# Patient Record
Sex: FEMALE | Race: WHITE | NOT HISPANIC OR LATINO | Employment: UNEMPLOYED | URBAN - METROPOLITAN AREA
[De-identification: names, ages, dates, MRNs, and addresses within clinical notes are randomized per-mention and may not be internally consistent; named-entity substitution may affect disease eponyms.]

---

## 2023-03-12 DIAGNOSIS — K21.9 GASTRO-ESOPHAGEAL REFLUX DISEASE WITHOUT ESOPHAGITIS: ICD-10-CM

## 2023-03-12 DIAGNOSIS — I10 ESSENTIAL (PRIMARY) HYPERTENSION: ICD-10-CM

## 2023-03-13 ENCOUNTER — TELEPHONE (OUTPATIENT)
Dept: PRIMARY CARE | Facility: CLINIC | Age: 53
End: 2023-03-13
Payer: MEDICARE

## 2023-03-13 DIAGNOSIS — I10 ESSENTIAL (PRIMARY) HYPERTENSION: ICD-10-CM

## 2023-03-13 RX ORDER — OMEPRAZOLE 40 MG/1
CAPSULE, DELAYED RELEASE ORAL
Qty: 90 CAPSULE | Refills: 2 | Status: SHIPPED | OUTPATIENT
Start: 2023-03-13 | End: 2023-11-06

## 2023-03-13 RX ORDER — AMLODIPINE BESYLATE 5 MG/1
5 TABLET ORAL DAILY
Qty: 90 TABLET | Refills: 3 | Status: SHIPPED | OUTPATIENT
Start: 2023-03-13 | End: 2023-06-13 | Stop reason: DRUGHIGH

## 2023-03-13 RX ORDER — AMLODIPINE BESYLATE 5 MG/1
TABLET ORAL
Qty: 90 TABLET | Refills: 3 | Status: SHIPPED | OUTPATIENT
Start: 2023-03-13 | End: 2023-03-13 | Stop reason: SDUPTHER

## 2023-04-11 ENCOUNTER — TELEPHONE (OUTPATIENT)
Dept: PRIMARY CARE | Facility: CLINIC | Age: 53
End: 2023-04-11

## 2023-05-24 ENCOUNTER — TELEMEDICINE (OUTPATIENT)
Dept: PRIMARY CARE | Facility: CLINIC | Age: 53
End: 2023-05-24
Payer: MEDICARE

## 2023-05-24 DIAGNOSIS — R05.9 COUGH, UNSPECIFIED TYPE: Primary | ICD-10-CM

## 2023-05-24 PROCEDURE — 99441 PR PHYS/QHP TELEPHONE EVALUATION 5-10 MIN: CPT | Performed by: INTERNAL MEDICINE

## 2023-05-24 RX ORDER — AZITHROMYCIN 250 MG/1
TABLET, FILM COATED ORAL
Qty: 6 TABLET | Refills: 0 | Status: SHIPPED | OUTPATIENT
Start: 2023-05-24 | End: 2023-05-29

## 2023-05-24 RX ORDER — BENZONATATE 200 MG/1
200 CAPSULE ORAL 3 TIMES DAILY PRN
Qty: 42 CAPSULE | Refills: 0 | Status: SHIPPED | OUTPATIENT
Start: 2023-05-24 | End: 2023-06-23

## 2023-05-24 ASSESSMENT — ENCOUNTER SYMPTOMS
SORE THROAT: 1
FEVER: 1
COUGH: 1
SHORTNESS OF BREATH: 0
FATIGUE: 1

## 2023-05-24 NOTE — PROGRESS NOTES
Subjective    Sandra Gutiérrez is a 53 y.o. female who is evaluated Via telemedicine,  Patient is complaining of high fever, chills.  Has cough, hoarse voice.    HPI    This is a 53 years old English-speaking lady with medical history significant for mental retardation, moderate, hypertension, hyperlipidemia, gastroesophageal reflux disease, gastritis, colitis, status post colectomy, anemia, s/p hospitalization for colonic pseudoobstruction, bradycardia, complete heart block, s/p St Rainer dual chamber PPM implant 5/6/2022 to 5/13/2022.  Patient  is evaluated Via Telemedicine.  History is obtained with help of her mother in law.   Has fever, chills, developed cough.  Has been sick for 2 day.  Blood pressure is stable, well controlled.  Has fatigue and malaise.  Patient has unsteady gait, arthralgia.  Interested to start on physical therapy.        Review of Systems   Constitutional:  Positive for fatigue and fever.   HENT:  Positive for sore throat.    Respiratory:  Positive for cough. Negative for shortness of breath.        Objective      There were no vitals filed for this visit.     Physical Exam  Diagnoses and all orders for this visit:  Cough, unspecified type (Primary)  -     azithromycin (Zithromax) 250 mg tablet; Take 2 tablets (500 mg) by mouth once daily for 1 day, THEN 1 tablet (250 mg) once daily for 4 days. Take 2 tabs (500 mg) by mouth today, than 1 daily for 4 days..  -     benzonatate (Tessalon) 200 mg capsule; Take 1 capsule (200 mg) by mouth 3 times a day as needed for cough. Do not crush or chew.     - Ataxic, unsteady gait.  Follow by the neurology.  Physical therapy.     - Hypertension.  Well controlled.  Continue to take amlodipine to 10 mg daily.  Avoid salty foods.  Exercise as much as you can.     - Hyperlipidemia.  Continue to take Lipitor 20 mg once a day.  Keep Mediterranean diet.  Repeat lipid panel today.     - Depression, anxiety.  Current therapy with Zoloft.  Depression screening as  above.  Follow-up with psychiatry.     - Mental retardation.  Moderate.  Supportive measures.     - H of hospitalization for complete heart block, colonic pseudoobstruction, s/p St Rainer Dual pacemaker placement 5/6/2022 5/13/2022.  Patient is stable.  Medications as directed.  Follow-up with Dr. Vargas cardiology.     - Colonic pseudoobstruction.  Chronic constipations.  Taking docusate and MiraLAX with relief.     - Gastroesophageal reflux disease.  Continue to take Nexium.   Eat small portions.  Avoid Caffeine, spicy foods, chocolate, alcohol.  Do not wear tight clothes.  Keep last meal before bed time > 3 hours.  Keep head side of the bed elevated at all time.     - Overactive Bladder.  Follow up with urology.  Urinalysis.     - Anemia.  Follow up with GI.  Blood work today.     - Health maintenance.  Return for Medicare wellness annual exam.  Return for vaccinations.  GYN exam.  Mammography.     - Normal body weight with BMI 20.51  Keep ideal BMI is less than 25.  Diet, exercise.     Less, than  10 min        follow-up in 1 months.   Sandra Zaidi MD

## 2023-05-25 ENCOUNTER — APPOINTMENT (OUTPATIENT)
Dept: PRIMARY CARE | Facility: CLINIC | Age: 53
End: 2023-05-25
Payer: MEDICARE

## 2023-06-04 DIAGNOSIS — Z00.00 ENCOUNTER FOR GENERAL ADULT MEDICAL EXAMINATION WITHOUT ABNORMAL FINDINGS: ICD-10-CM

## 2023-06-06 RX ORDER — ACETAMINOPHEN 500 MG
TABLET ORAL
Qty: 90 CAPSULE | Refills: 2 | Status: SHIPPED | OUTPATIENT
Start: 2023-06-06 | End: 2023-09-04

## 2023-06-13 ENCOUNTER — OFFICE VISIT (OUTPATIENT)
Dept: PRIMARY CARE | Facility: CLINIC | Age: 53
End: 2023-06-13
Payer: MEDICARE

## 2023-06-13 VITALS
WEIGHT: 103 LBS | TEMPERATURE: 94.3 F | HEIGHT: 59 IN | BODY MASS INDEX: 20.76 KG/M2 | HEART RATE: 64 BPM | RESPIRATION RATE: 16 BRPM | DIASTOLIC BLOOD PRESSURE: 72 MMHG | SYSTOLIC BLOOD PRESSURE: 132 MMHG

## 2023-06-13 DIAGNOSIS — I44.2 COMPLETE HEART BLOCK (MULTI): ICD-10-CM

## 2023-06-13 DIAGNOSIS — R53.81 MALAISE AND FATIGUE: ICD-10-CM

## 2023-06-13 DIAGNOSIS — E87.6 HYPOKALEMIA: ICD-10-CM

## 2023-06-13 DIAGNOSIS — R53.83 MALAISE AND FATIGUE: ICD-10-CM

## 2023-06-13 DIAGNOSIS — Z12.31 ENCOUNTER FOR SCREENING MAMMOGRAM FOR MALIGNANT NEOPLASM OF BREAST: Primary | ICD-10-CM

## 2023-06-13 DIAGNOSIS — E55.9 VITAMIN D DEFICIENCY: ICD-10-CM

## 2023-06-13 DIAGNOSIS — M25.512 LEFT SHOULDER PAIN, UNSPECIFIED CHRONICITY: ICD-10-CM

## 2023-06-13 DIAGNOSIS — E53.8 VITAMIN B12 DEFICIENCY: ICD-10-CM

## 2023-06-13 DIAGNOSIS — E78.5 HYPERLIPIDEMIA, UNSPECIFIED HYPERLIPIDEMIA TYPE: ICD-10-CM

## 2023-06-13 DIAGNOSIS — R73.9 HYPERGLYCEMIA, UNSPECIFIED: ICD-10-CM

## 2023-06-13 DIAGNOSIS — F33.9 DEPRESSION, RECURRENT (CMS-HCC): ICD-10-CM

## 2023-06-13 DIAGNOSIS — Q02 MICROCEPHALY (MULTI): ICD-10-CM

## 2023-06-13 DIAGNOSIS — E03.9 HYPOTHYROIDISM, UNSPECIFIED TYPE: ICD-10-CM

## 2023-06-13 PROCEDURE — 80053 COMPREHEN METABOLIC PANEL: CPT | Performed by: INTERNAL MEDICINE

## 2023-06-13 PROCEDURE — 82306 VITAMIN D 25 HYDROXY: CPT | Performed by: INTERNAL MEDICINE

## 2023-06-13 PROCEDURE — 80061 LIPID PANEL: CPT | Performed by: INTERNAL MEDICINE

## 2023-06-13 PROCEDURE — 1036F TOBACCO NON-USER: CPT | Performed by: INTERNAL MEDICINE

## 2023-06-13 PROCEDURE — 82607 VITAMIN B-12: CPT | Performed by: INTERNAL MEDICINE

## 2023-06-13 PROCEDURE — 85025 COMPLETE CBC W/AUTO DIFF WBC: CPT | Performed by: INTERNAL MEDICINE

## 2023-06-13 PROCEDURE — 99213 OFFICE O/P EST LOW 20 MIN: CPT | Performed by: INTERNAL MEDICINE

## 2023-06-13 PROCEDURE — 84443 ASSAY THYROID STIM HORMONE: CPT | Performed by: INTERNAL MEDICINE

## 2023-06-13 PROCEDURE — 83036 HEMOGLOBIN GLYCOSYLATED A1C: CPT | Performed by: INTERNAL MEDICINE

## 2023-06-13 RX ORDER — SENNOSIDES 8.6 MG/1
TABLET ORAL 2 TIMES DAILY
COMMUNITY
Start: 2022-05-13 | End: 2024-04-10 | Stop reason: ALTCHOICE

## 2023-06-13 RX ORDER — POLYETHYLENE GLYCOL 3350 17 G/17G
POWDER, FOR SOLUTION ORAL
COMMUNITY
Start: 2018-04-16

## 2023-06-13 RX ORDER — SODIUM PICOSULFATE, MAGNESIUM OXIDE, AND ANHYDROUS CITRIC ACID 10; 3.5; 12 MG/160ML; G/160ML; G/160ML
LIQUID ORAL
COMMUNITY
Start: 2021-10-26 | End: 2024-04-10 | Stop reason: ALTCHOICE

## 2023-06-13 RX ORDER — CICLOPIROX 1 G/100ML
SHAMPOO TOPICAL
COMMUNITY
Start: 2016-04-26

## 2023-06-13 RX ORDER — CYANOCOBALAMIN/FOLIC AC/VIT B6 1-2.2-25MG
1 TABLET ORAL DAILY
COMMUNITY
Start: 2023-05-22

## 2023-06-13 RX ORDER — POTASSIUM CHLORIDE 20 MEQ/1
2 TABLET, EXTENDED RELEASE ORAL DAILY
COMMUNITY
Start: 2018-10-14 | End: 2023-06-13 | Stop reason: SDUPTHER

## 2023-06-13 RX ORDER — AMLODIPINE BESYLATE 10 MG/1
1 TABLET ORAL DAILY
COMMUNITY
Start: 2014-12-22 | End: 2024-04-25 | Stop reason: SDUPTHER

## 2023-06-13 RX ORDER — ATENOLOL 25 MG/1
TABLET ORAL
COMMUNITY
Start: 2007-05-09

## 2023-06-13 RX ORDER — DOCUSATE SODIUM 100 MG/1
CAPSULE, LIQUID FILLED ORAL 2 TIMES DAILY
COMMUNITY
Start: 2022-05-13 | End: 2024-04-10 | Stop reason: ALTCHOICE

## 2023-06-13 RX ORDER — ESOMEPRAZOLE MAGNESIUM 40 MG/1
CAPSULE, DELAYED RELEASE ORAL
COMMUNITY
Start: 2007-05-09

## 2023-06-13 RX ORDER — POTASSIUM CHLORIDE 20 MEQ/1
40 TABLET, EXTENDED RELEASE ORAL DAILY
Qty: 60 TABLET | Refills: 3 | Status: SHIPPED | OUTPATIENT
Start: 2023-06-13 | End: 2023-07-06

## 2023-06-13 RX ORDER — SERTRALINE HYDROCHLORIDE 25 MG/1
25 TABLET, FILM COATED ORAL DAILY
COMMUNITY

## 2023-06-13 ASSESSMENT — ENCOUNTER SYMPTOMS
LOSS OF SENSATION IN FEET: 1
FREQUENCY: 1
NERVOUS/ANXIOUS: 1
APPETITE CHANGE: 1
SLEEP DISTURBANCE: 1
ABDOMINAL DISTENTION: 0
ARTHRALGIAS: 1
OCCASIONAL FEELINGS OF UNSTEADINESS: 1
CONSTIPATION: 1
WEAKNESS: 1
ACTIVITY CHANGE: 1
UNEXPECTED WEIGHT CHANGE: 0
BACK PAIN: 1

## 2023-06-13 NOTE — PROGRESS NOTES
Subjective    Sandra Gutiérrez is a 53 y.o. female who presents for  follow up.  Weak.  Patient is here for Blood work.    HPI    This is a 53 years old Sudanese-speaking lady with medical history significant for mental retardation, moderate, hypertension, hyperlipidemia, gastroesophageal reflux disease, gastritis, colitis, status post colectomy, anemia, s/p hospitalization for colonic pseudoobstruction, bradycardia, complete heart block, s/p St Rainer dual chamber PPM implant 5/6/2022 to 5/13/2022.  Patient is presented for evaluation and treatment of the above complaints.  Blood pressure is stable, well controlled.  Has fatigue and malaise.  Patient has unsteady gait, arthralgia.  Interested to start on physical therapy.     Has been biting nails, cannot control it.  Weak.     Still has occasional headaches.  Having small joint pain.  Taking medications as directed.  Has insomnia .     Review of Systems   Constitutional:  Positive for activity change and appetite change. Negative for unexpected weight change.   Gastrointestinal:  Positive for constipation. Negative for abdominal distention.   Genitourinary:  Positive for frequency.   Musculoskeletal:  Positive for arthralgias, back pain and gait problem.   Neurological:  Positive for weakness.   Psychiatric/Behavioral:  Positive for sleep disturbance. The patient is nervous/anxious.        Objective        Vitals:    06/13/23 1134   BP: 132/72   Pulse: 64   Resp: 16   Temp: 34.6 °C (94.3 °F)        Physical Exam  Constitutional:       Appearance: Normal appearance.   HENT:      Head: Normocephalic and atraumatic.      Nose: Nose normal.      Mouth/Throat:      Mouth: Mucous membranes are moist.   Cardiovascular:      Rate and Rhythm: Normal rate.   Pulmonary:      Effort: Pulmonary effort is normal.   Abdominal:      Palpations: Abdomen is soft.   Musculoskeletal:         General: Normal range of motion.      Cervical back: Normal range of motion.   Skin:     General:  Skin is warm and dry.   Neurological:      General: No focal deficit present.      Mental Status: She is alert. Mental status is at baseline.   Psychiatric:         Mood and Affect: Mood normal.       Diagnoses and all orders for this visit:  Encounter for screening mammogram for malignant neoplasm of breast (Primary)  -     BI mammo bilateral screening tomosynthesis; Future  Left shoulder pain, unspecified chronicity  -     XR shoulder left 2+ views; Future  -     XR shoulder left 2+ views  Microcephaly (CMS/HCC)  Depression, recurrent (CMS/HCC)  Complete heart block (CMS/HCC)  Hyperlipidemia, unspecified hyperlipidemia type  -     Comprehensive Metabolic Panel  -     Lipid Panel  Hyperglycemia, unspecified  -     Hemoglobin A1C  Vitamin B12 deficiency  -     Vitamin B12  Vitamin D deficiency  -     Vitamin D, Total  Hypothyroidism, unspecified type  -     Thyroid Stimulating Hormone  Malaise and fatigue  -     CBC  Hypokalemia  -     potassium chloride CR (Klor-Con M20) 20 mEq ER tablet; Take 2 tablets (40 mEq) by mouth once daily.       - Ataxic, unsteady gait.  Follow by the neurology.  Start on physical therapy.     - Hypertension.  Well controlled.  Continue to take amlodipine to 10 mg daily.  Avoid salty foods.  Exercise as much as you can.     - Hyperlipidemia.  Continue to take Lipitor 20 mg once a day.  Keep Mediterranean diet.  Repeat lipid panel today.     - Depression, anxiety.  Current therapy with Zoloft.  Depression screening as above.  Follow-up with psychiatry.     - Mental retardation.  Moderate.  Supportive measures.     - H of hospitalization for complete heart block, colonic pseudoobstruction, s/p St Rainer Dual pacemaker placement 5/6/2022 5/13/2022.  Patient is stable.  Medications as directed.  Follow-up with Dr. Vargas cardiology.     - Colonic pseudoobstruction.  Chronic constipations.  Taking docusate and MiraLAX with relief.     - Gastroesophageal reflux disease.  Continue to take  Nexium.   Eat small portions.  Avoid Caffeine, spicy foods, chocolate, alcohol.  Do not wear tight clothes.  Keep last meal before bed time > 3 hours.  Keep head side of the bed elevated at all time.     - Overactive Bladder.  Follow up with urology.  Urinalysis.     - Anemia.  Follow up with GI.  Blood work today.     - Health maintenance.  Return for Medicare wellness annual exam.  Return for vaccinations.  GYN exam.  Mammography.     - Normal body weight with BMI 20.80  Keep ideal BMI is less than 25.  Diet, exercise.        Sandra Zaidi MD Patient was identified as a fall risk. Risk prevention instructions provided.

## 2023-06-13 NOTE — PATIENT INSTRUCTIONS

## 2023-06-14 DIAGNOSIS — R26.81 UNSTEADY GAIT: Primary | ICD-10-CM

## 2023-06-14 DIAGNOSIS — H61.23 EXCESSIVE EAR WAX, BILATERAL: ICD-10-CM

## 2023-06-14 NOTE — TELEPHONE ENCOUNTER
Discussed with patient mother-in-law about blood work results.  Hemoglobin A1c 6.3.  White blood cell count is 4.43.  Has decreased hemoglobin 10.3.  Patient has low sodium 135.  Continue with same medications for cholesterol, total 168, LDL is 73.     Patient

## 2023-06-15 DIAGNOSIS — M25.512 LEFT SHOULDER PAIN, UNSPECIFIED CHRONICITY: Primary | ICD-10-CM

## 2023-06-23 PROCEDURE — G0179 MD RECERTIFICATION HHA PT: HCPCS | Performed by: INTERNAL MEDICINE

## 2023-07-06 DIAGNOSIS — E87.6 HYPOKALEMIA: ICD-10-CM

## 2023-07-06 RX ORDER — POTASSIUM CHLORIDE 1500 MG/1
TABLET, EXTENDED RELEASE ORAL
Qty: 60 TABLET | Refills: 3 | Status: SHIPPED | OUTPATIENT
Start: 2023-07-06 | End: 2023-07-24

## 2023-07-23 DIAGNOSIS — E87.6 HYPOKALEMIA: ICD-10-CM

## 2023-07-24 RX ORDER — POTASSIUM CHLORIDE 1500 MG/1
40 TABLET, EXTENDED RELEASE ORAL DAILY
Qty: 180 TABLET | Refills: 1 | Status: SHIPPED | OUTPATIENT
Start: 2023-07-24 | End: 2024-03-11

## 2023-08-22 PROCEDURE — G0179 MD RECERTIFICATION HHA PT: HCPCS | Performed by: INTERNAL MEDICINE

## 2023-10-09 ENCOUNTER — HOSPITAL ENCOUNTER (OUTPATIENT)
Dept: CARDIOLOGY | Facility: CLINIC | Age: 53
Discharge: HOME | End: 2023-10-09
Payer: MEDICARE

## 2023-10-09 DIAGNOSIS — I44.2 ATRIOVENTRICULAR BLOCK, COMPLETE (MULTI): ICD-10-CM

## 2023-10-09 DIAGNOSIS — Z95.0 PRESENCE OF CARDIAC PACEMAKER: ICD-10-CM

## 2023-10-09 PROCEDURE — 93296 REM INTERROG EVL PM/IDS: CPT

## 2023-10-09 PROCEDURE — 93294 REM INTERROG EVL PM/LDLS PM: CPT | Performed by: INTERNAL MEDICINE

## 2023-12-20 PROCEDURE — G0179 MD RECERTIFICATION HHA PT: HCPCS | Performed by: INTERNAL MEDICINE

## 2024-01-15 ENCOUNTER — HOSPITAL ENCOUNTER (OUTPATIENT)
Dept: CARDIOLOGY | Facility: CLINIC | Age: 54
Discharge: HOME | End: 2024-01-15
Payer: MEDICARE

## 2024-01-15 DIAGNOSIS — I44.2 ATRIOVENTRICULAR BLOCK, COMPLETE (MULTI): ICD-10-CM

## 2024-01-15 DIAGNOSIS — Z95.0 PRESENCE OF CARDIAC PACEMAKER: ICD-10-CM

## 2024-01-15 PROCEDURE — 93294 REM INTERROG EVL PM/LDLS PM: CPT | Performed by: INTERNAL MEDICINE

## 2024-01-15 PROCEDURE — 93296 REM INTERROG EVL PM/IDS: CPT

## 2024-01-30 ENCOUNTER — OFFICE VISIT (OUTPATIENT)
Dept: PRIMARY CARE | Facility: CLINIC | Age: 54
End: 2024-01-30
Payer: MEDICARE

## 2024-01-30 VITALS
HEART RATE: 64 BPM | BODY MASS INDEX: 20.51 KG/M2 | SYSTOLIC BLOOD PRESSURE: 122 MMHG | TEMPERATURE: 97.7 F | WEIGHT: 105 LBS | RESPIRATION RATE: 16 BRPM | DIASTOLIC BLOOD PRESSURE: 78 MMHG

## 2024-01-30 DIAGNOSIS — Z78.0 ASYMPTOMATIC MENOPAUSAL STATE: ICD-10-CM

## 2024-01-30 DIAGNOSIS — R39.15 URGENCY OF URINATION: ICD-10-CM

## 2024-01-30 DIAGNOSIS — Z12.11 ENCOUNTER FOR SCREENING FOR MALIGNANT NEOPLASM OF COLON: ICD-10-CM

## 2024-01-30 DIAGNOSIS — I73.9 PERIPHERAL VASCULAR DISEASE, UNSPECIFIED (CMS-HCC): ICD-10-CM

## 2024-01-30 DIAGNOSIS — E55.9 VITAMIN D DEFICIENCY: ICD-10-CM

## 2024-01-30 DIAGNOSIS — R32 URINARY INCONTINENCE, UNSPECIFIED TYPE: ICD-10-CM

## 2024-01-30 DIAGNOSIS — F33.1 MAJOR DEPRESSIVE DISORDER, RECURRENT, MODERATE (MULTI): ICD-10-CM

## 2024-01-30 DIAGNOSIS — R53.83 MALAISE AND FATIGUE: ICD-10-CM

## 2024-01-30 DIAGNOSIS — R73.9 HYPERGLYCEMIA, UNSPECIFIED: ICD-10-CM

## 2024-01-30 DIAGNOSIS — R53.81 MALAISE AND FATIGUE: ICD-10-CM

## 2024-01-30 DIAGNOSIS — I44.2 COMPLETE HEART BLOCK (MULTI): ICD-10-CM

## 2024-01-30 DIAGNOSIS — E53.8 VITAMIN B12 DEFICIENCY: ICD-10-CM

## 2024-01-30 DIAGNOSIS — Q02 MICROCEPHALY (MULTI): ICD-10-CM

## 2024-01-30 DIAGNOSIS — E78.5 HYPERLIPIDEMIA, UNSPECIFIED HYPERLIPIDEMIA TYPE: ICD-10-CM

## 2024-01-30 DIAGNOSIS — Z00.00 ROUTINE GENERAL MEDICAL EXAMINATION AT HEALTH CARE FACILITY: ICD-10-CM

## 2024-01-30 DIAGNOSIS — Z12.31 ENCOUNTER FOR SCREENING MAMMOGRAM FOR MALIGNANT NEOPLASM OF BREAST: ICD-10-CM

## 2024-01-30 DIAGNOSIS — R26.81 UNSTEADY GAIT: Primary | ICD-10-CM

## 2024-01-30 DIAGNOSIS — F33.9 DEPRESSION, RECURRENT (CMS-HCC): ICD-10-CM

## 2024-01-30 DIAGNOSIS — E78.2 MIXED HYPERLIPIDEMIA: ICD-10-CM

## 2024-01-30 LAB
APPEARANCE UR: CLEAR
BILIRUB UR QL STRIP: NEGATIVE
COLOR UR: YELLOW
GLUCOSE UR STRIP-MCNC: NEGATIVE MG/DL
HGB UR QL STRIP: NEGATIVE
KETONES UR STRIP-MCNC: NEGATIVE MG/DL
LEUKOCYTE ESTERASE UR QL STRIP: NEGATIVE
NITRITE UR QL STRIP: NEGATIVE
PH UR STRIP: 7 [PH]
PROT UR STRIP-MCNC: NEGATIVE MG/DL
SP GR UR STRIP.AUTO: 1.02
UROBILINOGEN UR STRIP-ACNC: 0.2 E.U./DL

## 2024-01-30 PROCEDURE — G0439 PPPS, SUBSEQ VISIT: HCPCS | Performed by: INTERNAL MEDICINE

## 2024-01-30 PROCEDURE — 84460 ALANINE AMINO (ALT) (SGPT): CPT | Performed by: INTERNAL MEDICINE

## 2024-01-30 PROCEDURE — 99213 OFFICE O/P EST LOW 20 MIN: CPT | Performed by: INTERNAL MEDICINE

## 2024-01-30 PROCEDURE — 84450 TRANSFERASE (AST) (SGOT): CPT | Performed by: INTERNAL MEDICINE

## 2024-01-30 PROCEDURE — 1036F TOBACCO NON-USER: CPT | Performed by: INTERNAL MEDICINE

## 2024-01-30 PROCEDURE — 82607 VITAMIN B-12: CPT | Performed by: INTERNAL MEDICINE

## 2024-01-30 PROCEDURE — 83036 HEMOGLOBIN GLYCOSYLATED A1C: CPT | Performed by: INTERNAL MEDICINE

## 2024-01-30 PROCEDURE — 80061 LIPID PANEL: CPT | Performed by: INTERNAL MEDICINE

## 2024-01-30 PROCEDURE — 84443 ASSAY THYROID STIM HORMONE: CPT | Performed by: INTERNAL MEDICINE

## 2024-01-30 PROCEDURE — 80048 BASIC METABOLIC PNL TOTAL CA: CPT | Performed by: INTERNAL MEDICINE

## 2024-01-30 PROCEDURE — 85025 COMPLETE CBC W/AUTO DIFF WBC: CPT | Performed by: INTERNAL MEDICINE

## 2024-01-30 PROCEDURE — 81003 URINALYSIS AUTO W/O SCOPE: CPT | Performed by: INTERNAL MEDICINE

## 2024-01-30 PROCEDURE — 82306 VITAMIN D 25 HYDROXY: CPT | Performed by: INTERNAL MEDICINE

## 2024-01-30 RX ORDER — ACETAMINOPHEN 500 MG
TABLET ORAL DAILY
COMMUNITY
Start: 2024-01-07 | End: 2024-04-08

## 2024-01-30 RX ORDER — ATORVASTATIN CALCIUM 40 MG/1
40 TABLET, FILM COATED ORAL DAILY
Qty: 90 TABLET | Refills: 3 | Status: SHIPPED | OUTPATIENT
Start: 2024-01-30 | End: 2024-04-29

## 2024-01-30 ASSESSMENT — ANXIETY QUESTIONNAIRES
3. WORRYING TOO MUCH ABOUT DIFFERENT THINGS: SEVERAL DAYS
7. FEELING AFRAID AS IF SOMETHING AWFUL MIGHT HAPPEN: SEVERAL DAYS
1. FEELING NERVOUS, ANXIOUS, OR ON EDGE: SEVERAL DAYS
GAD7 TOTAL SCORE: 7
6. BECOMING EASILY ANNOYED OR IRRITABLE: SEVERAL DAYS
5. BEING SO RESTLESS THAT IT IS HARD TO SIT STILL: SEVERAL DAYS
4. TROUBLE RELAXING: SEVERAL DAYS
2. NOT BEING ABLE TO STOP OR CONTROL WORRYING: SEVERAL DAYS
IF YOU CHECKED OFF ANY PROBLEMS ON THIS QUESTIONNAIRE, HOW DIFFICULT HAVE THESE PROBLEMS MADE IT FOR YOU TO DO YOUR WORK, TAKE CARE OF THINGS AT HOME, OR GET ALONG WITH OTHER PEOPLE: SOMEWHAT DIFFICULT

## 2024-01-30 ASSESSMENT — PATIENT HEALTH QUESTIONNAIRE - PHQ9
10. IF YOU CHECKED OFF ANY PROBLEMS, HOW DIFFICULT HAVE THESE PROBLEMS MADE IT FOR YOU TO DO YOUR WORK, TAKE CARE OF THINGS AT HOME, OR GET ALONG WITH OTHER PEOPLE: SOMEWHAT DIFFICULT
1. LITTLE INTEREST OR PLEASURE IN DOING THINGS: SEVERAL DAYS
2. FEELING DOWN, DEPRESSED OR HOPELESS: SEVERAL DAYS
SUM OF ALL RESPONSES TO PHQ9 QUESTIONS 1 AND 2: 2

## 2024-01-30 ASSESSMENT — ENCOUNTER SYMPTOMS
APPETITE CHANGE: 1
BACK PAIN: 1
SHORTNESS OF BREATH: 0
NERVOUS/ANXIOUS: 1
OCCASIONAL FEELINGS OF UNSTEADINESS: 1
APNEA: 0
FATIGUE: 1
DEPRESSION: 1
LOSS OF SENSATION IN FEET: 1
HEADACHES: 0
ARTHRALGIAS: 1
LIGHT-HEADEDNESS: 0
FEVER: 0
PALPITATIONS: 0
SLEEP DISTURBANCE: 1
ACTIVITY CHANGE: 1

## 2024-01-30 ASSESSMENT — PAIN SCALES - GENERAL: PAINLEVEL: 0-NO PAIN

## 2024-01-30 ASSESSMENT — COLUMBIA-SUICIDE SEVERITY RATING SCALE - C-SSRS
1. IN THE PAST MONTH, HAVE YOU WISHED YOU WERE DEAD OR WISHED YOU COULD GO TO SLEEP AND NOT WAKE UP?: NO
6. HAVE YOU EVER DONE ANYTHING, STARTED TO DO ANYTHING, OR PREPARED TO DO ANYTHING TO END YOUR LIFE?: NO
2. HAVE YOU ACTUALLY HAD ANY THOUGHTS OF KILLING YOURSELF?: NO

## 2024-01-30 NOTE — PROGRESS NOTES
Subjective    Sandra Gutiérrez is a 53 y.o. female who presents for  follow up.  Has unsteady gait.  Weak.  Patient is here for Blood work.    HPI    This is a 53 years old Nepalese-speaking lady with medical history significant for mental retardation, moderate, hypertension, hyperlipidemia, gastroesophageal reflux disease, gastritis, colitis, status post colectomy, anemia, s/p hospitalization for colonic pseudoobstruction, bradycardia, complete heart block, s/p St Rainer dual chamber PPM implant 5/6/2022 to 5/13/2022.  Patient is presented for evaluation and treatment of the above complaints.  Blood pressure is stable, well controlled.  Has fatigue and malaise.  Patient has unsteady gait, arthralgia.  Interested to start on physical therapy.  Has fall episodes.     Has been biting nails, cannot control it.  Weak.     Still has occasional headaches.  Having small joint pain.  Taking medications as directed.  Has insomnia .     Review of Systems   Constitutional:  Positive for activity change, appetite change and fatigue. Negative for fever.   Respiratory:  Negative for apnea and shortness of breath.    Cardiovascular:  Negative for chest pain, palpitations and leg swelling.   Musculoskeletal:  Positive for arthralgias, back pain and gait problem.   Neurological:  Negative for light-headedness and headaches.   Psychiatric/Behavioral:  Positive for sleep disturbance. The patient is nervous/anxious.        Objective        Vitals:    01/30/24 1058   BP: 122/78   Pulse: 64   Resp: 16   Temp: 36.5 °C (97.7 °F)        Physical Exam  HENT:      Head: Normocephalic.      Right Ear: Tympanic membrane normal.      Left Ear: Tympanic membrane normal.   Cardiovascular:      Rate and Rhythm: Normal rate.      Pulses: Normal pulses.   Pulmonary:      Effort: Pulmonary effort is normal.      Breath sounds: Normal breath sounds.   Abdominal:      Palpations: Abdomen is soft.   Skin:     General: Skin is warm.   Neurological:       General: No focal deficit present.      Mental Status: She is alert.   Psychiatric:         Mood and Affect: Mood normal.       Diagnoses and all orders for this visit:  Unsteady gait (Primary)  -     Referral to Physical Therapy; Future  Urgency of urination  -     POCT UA (Automated) docked device  Hyperlipidemia, unspecified hyperlipidemia type  -     Alanine Aminotransferase  -     Aspartate Aminotransferase  -     Basic Metabolic Panel  -     Lipid Panel  Vitamin B12 deficiency  -     Vitamin B12  Vitamin D deficiency  -     Vitamin D 25-Hydroxy,Total (for eval of Vitamin D levels)  Malaise and fatigue  -     CBC  -     Thyroid Stimulating Hormone  Hyperglycemia, unspecified  -     Hemoglobin A1C  Encounter for screening mammogram for malignant neoplasm of breast  -     BI mammo bilateral screening tomosynthesis; Future  Encounter for screening for malignant neoplasm of colon  -     Colonoscopy Screening; High Risk Patient; Future  Urinary incontinence, unspecified type  -     Referral to Urogynecology; Future  Major depressive disorder, recurrent, moderate (CMS/HCC)  Peripheral vascular disease, unspecified (CMS/HCC)  Microcephaly (CMS/HCC)  Depression, recurrent (CMS/HCC)  Complete heart block (CMS/HCC)  Asymptomatic menopausal state  -     XR DEXA bone density; Future  Mixed hyperlipidemia  -     atorvastatin (Lipitor) 40 mg tablet; Take 1 tablet (40 mg) by mouth once daily.  Routine general medical examination at health care facility  -     1 Year Follow Up In Primary Care - Wellness Exam; Future  Other orders  -     POCT URINALYSIS AUTOMATED       Ataxic, unsteady gait.  Follow by the neurology.  Start on physical therapy.     - Hypertension.  Well controlled.  Continue to take amlodipine to 10 mg daily.  Avoid salty foods.  Exercise as much as you can.     - Hyperlipidemia.  Continue to take Lipitor 20 mg once a day.  Keep Mediterranean diet.  Repeat lipid panel today.  Casandra 170, LDL 62.     -  Depression, anxiety.  Current therapy with Zoloft.  Depression screening as above.  Follow-up with psychiatry.     - Mental retardation.  Moderate.  Supportive measures.     - H of hospitalization for complete heart block, colonic pseudoobstruction, s/p St Rainer Dual pacemaker placement 5/6/2022 5/13/2022.  Patient is stable.  Medications as directed.  Follow-up with Dr. Vargas cardiology.     - Colonic pseudoobstruction.  Chronic constipations.  Taking docusate and MiraLAX with relief.     - Gastroesophageal reflux disease.  Continue to take Nexium.   Eat small portions.  Avoid Caffeine, spicy foods, chocolate, alcohol.  Do not wear tight clothes.  Keep last meal before bed time > 3 hours.  Keep head side of the bed elevated at all time.     - Overactive Bladder.  Follow up with urology.  Urinalysis.     - Anemia.  Follow up with GI.   Hb 10. 45, H 32.2.    - Health maintenance.  Medicare wellness annual exam.  Return for vaccinations.  GYN exam.  Mammography.     - Normal body weight with BMI 20.51  Keep ideal BMI is less than 25.  Diet, exercise.      Sandra Zaidi MD   Patient was identified as a fall risk.   Risk prevention instructions provided.

## 2024-02-08 DIAGNOSIS — R10.9 ABDOMINAL PAIN, UNSPECIFIED ABDOMINAL LOCATION: Primary | ICD-10-CM

## 2024-02-15 ENCOUNTER — EVALUATION (OUTPATIENT)
Dept: PHYSICAL THERAPY | Facility: CLINIC | Age: 54
End: 2024-02-15
Payer: MEDICARE

## 2024-02-15 VITALS — DIASTOLIC BLOOD PRESSURE: 87 MMHG | HEART RATE: 83 BPM | SYSTOLIC BLOOD PRESSURE: 143 MMHG

## 2024-02-15 DIAGNOSIS — Z91.81 AT RISK FOR FALLS: ICD-10-CM

## 2024-02-15 DIAGNOSIS — R26.81 UNSTEADY GAIT: ICD-10-CM

## 2024-02-15 DIAGNOSIS — R26.81 UNSTEADINESS ON FEET: Primary | ICD-10-CM

## 2024-02-15 PROCEDURE — 97163 PT EVAL HIGH COMPLEX 45 MIN: CPT | Mod: GP | Performed by: PHYSICAL THERAPIST

## 2024-02-15 PROCEDURE — 97116 GAIT TRAINING THERAPY: CPT | Mod: GP | Performed by: PHYSICAL THERAPIST

## 2024-02-15 ASSESSMENT — PAIN - FUNCTIONAL ASSESSMENT: PAIN_FUNCTIONAL_ASSESSMENT: 0-10

## 2024-02-15 ASSESSMENT — ENCOUNTER SYMPTOMS
OCCASIONAL FEELINGS OF UNSTEADINESS: 1
LOSS OF SENSATION IN FEET: 0
DEPRESSION: 1

## 2024-02-15 ASSESSMENT — BALANCE ASSESSMENTS
SITTING WITH BACK UNSUPPORTED BUT FEET SUPPORTED ON FLOOR OR ON A STOOL: ABLE TO SIT SAFELY AND SECURELY FOR 2 MINUTES
PLACE ALTERNATE FOOT ON STEP OR STOOL WHILE STANDING UNSUPPORTED: ABLE TO COMPLETE GREATER THAN 2 STEPS NEEDS MINIMAL ASSIST
STANDING TO SITTING: CONTROLS DESCENT BY USING HANDS
STANDING UNSUPPORTED ONE FOOT IN FRONT: NEEDS HELP TO STEP BUT CAN HOLD 15 SECONDS
TURN 360 DEGREES: NEEDS CLOSE SUPERVISION OR VERBAL CUEING
REACHING FORWARD WITH OUTSTRETCHED ARM WHILE STANDING: REACHES FORWARD BUT NEEDS SUPERVISION
STANDING UNSUPPORTED: ABLE TO STAND SAFELY FOR 2 MINUTES
PICK UP OBJECT FROM THE FLOOR FROM A STANDING POSITION: UNABLE TO PICK UP BUT REACHES 2-5 CM (1-2 INCHES) FROM SLIPPER AND KEEPS BALANCE INDEPENDENTLY
TRANSFERS: ABLE TO TRANSFER WITH VERBAL CUEING AND/OR SUPERVISION
STANDING ON ONE LEG: TRIES TO LIFT LEG UNABLE TO HOLD 3 SECONDS BUT REMAINS STANDING INDEPENDENTLY
PLACE ALTERNATE FOOT ON STEP OR STOOL WHILE STANDING UNSUPPORTED: NEEDS SUPERVISION WHEN TURNING
STANDING UNSUPPORTED WITH FEET TOGETHER: ABLE TO PLACE FEET TOGETHER INDEPENDENTLY AND STAND 1 MINUTE WITH SUPERVISION
LONG VERSION TOTAL SCORE (MAX 56): 30
STANDING TO SITTING: ABLE TO STAND INDEPENDENTLY USING HANDS
STANDING UNSUPPORTED WITH EYES CLOSED: ABLE TO STAND 10 SECONDS WITH SUPERVISION

## 2024-02-15 ASSESSMENT — PAIN SCALES - GENERAL: PAINLEVEL_OUTOF10: 0 - NO PAIN

## 2024-02-15 NOTE — PROGRESS NOTES
Physical Therapy    Physical Therapy Evaluation and Treatment      Patient Name: Sandra Gutiérrez  MRN: [id number]  Today's Date: 2/15/2024  Time Calculation  Start Time: 1033  Stop Time: 1145  Time Calculation (min): 72 min    Assessment:  Patient is a 53 year old female referred to [facility]'s outpatient physical therapy services with a chief complaint of imbalance and unsteadiness on her feet. Pt. Has s history of developmental delay with chronic gait/balance deficits. The patient was completing therapy every 6 months and responded well in the past. However, pt. Has not had therapy in the past year and has been falling more frequently in the past few months.  Pt. Presents with abnormal gait, reduced core control, postural instability with and without SBQC. I recommended that the patient utilize a rollator walker at all times, especially to and from her appointments. The patient scored in a fall risk category in fall functional outcome measures.  Pt. Scored a 30/56 on the WAGNER, with difficulty with turn negotiation, picking object off the ground and single limb stance exercises.  Pt. Will benefit from physical therapy to establish home exercise program, improve balance, provide education on safety and reduce fall risk in her home.        PT Assessment  PT Assessment Results: Decreased strength, Decreased range of motion, Decreased endurance, Impaired balance, Decreased mobility, Decreased coordination, Decreased cognition, Impaired judgement, Decreased safety awareness, Impaired vision  Rehab Prognosis: Fair     Plan:  OP PT Plan  Treatment/Interventions: Education/ Instruction, Neuromuscular re-education, Therapeutic activities, Therapeutic exercises, Gait training  PT Plan: Skilled PT  PT Frequency: 1 time per week  Duration: 10 visits  Onset Date: 01/30/24  Certification Period Start Date: 02/15/24  Certification Period End Date: 05/15/24  Rehab Potential: Fair  Plan of Care Agreement: Patient, Other  "(comment) ()    Current Problem:   1. Unsteadiness on feet        2. Unsteady gait  Referral to Physical Therapy    Follow Up In Physical Therapy    CANCELED: Follow Up In Physical Therapy      3. At risk for falls            Subjective    General:  Pt. [facility] year in the beginning of the year dn it helped.  She would go ever 6 months. Pt. Reports that her balance is not as good since not going to therapy.  She has had a few falls int he past 6 months, a few and they have been recently and she walked without it.  She is supposed to use her cane.  She has been using the cane for a few years.   \"Sandra\" present providing translation services.  She plans to go to to a urologist because she has incontinence.  She takes BP medication 4 times a day.    : Sandra BOURNE= [phone]      CLOF: has a friend, Dewayne's mother, who helps to shop and prepare food, has a home health aide comes every day about 3 hours from [facility]". Gets a ride from medical transportation.  Her and dewayne help each other with bathing, she dresses on her own     Home Setup: lives with roommate dewayne, lives in an apartment with an elevator to get in, bathtub with shower chair  Equipment: quad cane, has as rollator walker but she does not use it.   Hobbies: reading, does puzzles, watches TV  Physical Activity: does exercises from [facility] and she has some balance  Sleep: no problems   Hydration: 2 big bottles of water a day  Falls: multiple in the past 6 months   Precautions: has a pacemaker  Pt. Goal:  \"get stronger\"      HPI:  Dr. Sandra Zaidi MD Treatment note 1/30/24:   \"Other orders  -     POCT URINALYSIS AUTOMATED        Ataxic, unsteady gait.  Follow by the neurology.  Start on physical therapy.     - Hypertension.  Well controlled.  Continue to take amlodipine to 10 mg daily.  Avoid salty foods.  Exercise as much as you can.     - Hyperlipidemia.  Continue to take Lipitor " "20 mg once a day.  Keep Mediterranean diet.  Repeat lipid panel today.  Casandra 170, LDL 62.     - Depression, anxiety.  Current therapy with Zoloft.  Depression screening as above.  Follow-up with psychiatry.     - Mental retardation.  Moderate.  Supportive measures.     - H of hospitalization for complete heart block, colonic pseudoobstruction, s/p St Rainer Dual pacemaker placement 5/6/2022 5/13/2022.  Patient is stable.  Medications as directed.  Follow-up with Dr. Vargas cardiology.     - Colonic pseudoobstruction.  Chronic constipations.  Taking docusate and MiraLAX with relief.     - Gastroesophageal reflux disease.  Continue to take Nexium.   Eat small portions.  Avoid Caffeine, spicy foods, chocolate, alcohol.  Do not wear tight clothes.  Keep last meal before bed time > 3 hours.  Keep head side of the bed elevated at all time.     - Overactive Bladder.  Follow up with urology.  Urinalysis.     - Anemia.  Follow up with GI.   Hb 10. 45, H 32.2.     - Health maintenance.  Medicare wellness annual exam.  Return for vaccinations.  GYN exam.  Mammography.     - Normal body weight with BMI 20.51  Keep ideal BMI is less than 25.  Diet, exercise.      Sandra Zaidi MD   Patient was identified as a fall risk.   Risk prevention instructions provided.\"    General  Reason for Referral: unsteadiness on feet  Referred By: Dr. Sandra Zaidi MD  Preferred Learning Style: visual  Precautions:  Precautions  STEADI Fall Risk Score (The score of 4 or more indicates an increased risk of falling): 10  Precautions Comment: cardiac pacemaker, microencephaly, Complete heart block  Vital Signs:  Vital Signs  Heart Rate: 83  BP: 143/87  BP Location: Left arm  BP Method: Automatic  Patient Position: Sitting  Pain:  Pain Assessment  Pain Assessment: 0-10  Pain Score: 0 - No pain         Objective   Cognition: hx. Cognitive delay  observation= pt. Arrived to visit with SBQC  Coordination=moderate dysmetria in opposition bilaterally, " mild dec. Speed in pronation/supination, mild dysmetria DF/PF  Vision=wears glasses for distance, reads without them  Hearing=WNL  Sensation=WNL  Gait= narrow HARVEY, trunk held in excessive extension, decreased core control, decreased bilateral hip flexion bilaterally, decreased terminal hip extension bilaterally, tendency to veer to the left or right during ambulation and turns with no device requiring CGA->min-A  Transfers=BUE support at supervision assist    Strength:  Hip flexion 4-/5 bilaterally  Hip adduction =4-/5 bilaterally  Hip abd= 4-/5  Knee flexion=4+/5  Knee extension L=4-/5  Knee extension R=4/5  DF R=4-/5  DF L=4-/5  PF=4-/5 bilaterally  Eversion=4/5 bilaterally  Inversion=4/5 bilaterally    TU seconds with no device with CGA->min-A  17 seconds with SBQC with SBA     10MWT:  16.69 seconds with SBQC with SBA     12.13 seconds with rollator walker at supervision level     30sec.sit<>stand test: 9 reps with no UE support, utilizing back of legs against chair  5x sit<>stand test: 16 sec. with no UE support, utilizing back of legs against chair    WAGNER/56 (see flowsheet)    *due to executive function deficits pt. Not able to fill out ABC outcome measures       Treatments:    Gait trainin min.  Educated pt. On fall risk score in all outcome measures completed today  Educated pt. On my recommendation to utilize rollator at all times to decrease fall risk  Functional mobility 60'x1 with rollator with supervision   Functional mobility 50'x1 with rollator to bathroom with supervision  Functional mobility from bathroom to lobby with supervision level with rollator      EDUCATION:  Outpatient Education  Individual(s) Educated: Patient, Other ()  Education Provided: Home Exercise Program, Fall Risk, POC  Risk and Benefits Discussed with Patient/Caregiver/Other: yes  Patient/Caregiver Demonstrated Understanding: yes  Plan of Care Discussed and Agreed Upon: yes  Patient Response to  Education: Patient/Caregiver Verbalized Understanding of Information    Goals:  Active       PT Problem       PT Goal 1       Start:  02/15/24    Expected End:  05/15/24       Pt. Will complete floor to standing transfer at mod-I level by end of POC for fall recovery.         PT Goal 2       Start:  02/15/24    Expected End:  05/15/24       Pt. Will improve 10MWT by .13 m/s to meet normative value for substantial meaningful change for geriatric population compared to normative values.         PT Goal 3       Start:  02/15/24    Expected End:  05/15/24       Pt. Will improve WAGNER to 45/56 by end of POC to meet fall risk cut off score compared to normative values         PT Goal 4       Start:  02/15/24    Expected End:  05/15/24       Pt. Will improve 30 second sit to stand test by 2 repetitions to meet MCID for significant change.         PT Goal 5       Start:  02/15/24    Expected End:  05/15/24       Pt. Will be independent in HEP in next 1-2 visits in order to promote self efficacy and reach other long term goals.

## 2024-02-18 PROCEDURE — G0179 MD RECERTIFICATION HHA PT: HCPCS | Performed by: INTERNAL MEDICINE

## 2024-02-19 ENCOUNTER — TELEPHONE (OUTPATIENT)
Dept: PRIMARY CARE | Facility: CLINIC | Age: 54
End: 2024-02-19
Payer: MEDICARE

## 2024-02-19 DIAGNOSIS — S01.91XS LACERATION OF HEAD WITHOUT FOREIGN BODY, UNSPECIFIED PART OF HEAD, SEQUELA: ICD-10-CM

## 2024-02-19 DIAGNOSIS — G44.319 ACUTE POST-TRAUMATIC HEADACHE, NOT INTRACTABLE: ICD-10-CM
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
## 2024-02-19 NOTE — TELEPHONE ENCOUNTER
Patient fell at home today.  Currently, seen by urgent care.  Patient has large laceration on his scalp, getting sutures placed.  Advised to get transferred to the emergency room, should she have any symptoms.  Patient is in need for CT scan.

## 2024-02-21 ENCOUNTER — HOSPITAL ENCOUNTER (OUTPATIENT)
Dept: RADIOLOGY | Facility: CLINIC | Age: 54
Discharge: HOME | End: 2024-02-21
Payer: MEDICARE

## 2024-02-21 DIAGNOSIS — S01.91XS LACERATION OF HEAD WITHOUT FOREIGN BODY, UNSPECIFIED PART OF HEAD, SEQUELA: ICD-10-CM

## 2024-02-21 PROCEDURE — 70450 CT HEAD/BRAIN W/O DYE: CPT | Performed by: RADIOLOGY

## 2024-02-21 PROCEDURE — 70450 CT HEAD/BRAIN W/O DYE: CPT

## 2024-02-22 ENCOUNTER — APPOINTMENT (OUTPATIENT)
Dept: UROLOGY | Facility: CLINIC | Age: 54
End: 2024-02-22
Payer: MEDICARE

## 2024-03-04 ENCOUNTER — TREATMENT (OUTPATIENT)
Dept: PHYSICAL THERAPY | Facility: CLINIC | Age: 54
End: 2024-03-04
Payer: MEDICARE

## 2024-03-04 DIAGNOSIS — R26.81 UNSTEADY GAIT: ICD-10-CM

## 2024-03-04 PROCEDURE — 97110 THERAPEUTIC EXERCISES: CPT | Mod: GP | Performed by: PHYSICAL THERAPIST

## 2024-03-04 PROCEDURE — 97112 NEUROMUSCULAR REEDUCATION: CPT | Mod: GP | Performed by: PHYSICAL THERAPIST

## 2024-03-04 PROCEDURE — 97530 THERAPEUTIC ACTIVITIES: CPT | Mod: GP | Performed by: PHYSICAL THERAPIST

## 2024-03-04 ASSESSMENT — PAIN SCALES - WONG BAKER: WONGBAKER_NUMERICALRESPONSE: HURTS LITTLE MORE

## 2024-03-04 ASSESSMENT — PAIN - FUNCTIONAL ASSESSMENT: PAIN_FUNCTIONAL_ASSESSMENT: WONG-BAKER FACES

## 2024-03-04 NOTE — PROGRESS NOTES
Physical Therapy    Physical Therapy Treatment    Patient Name: Sandra Gutiérrez  MRN: [id number]  Today's Date: 3/4/2024  Treatment visit: 2  [employer]  POC end date: 5/15/24  Time Calculation  Start Time: 1030  Stop Time: 1124  Time Calculation (min): 54 min  PT Therapeutic Procedures Time Entry  Neuromuscular Re-Education Time Entry: 25  Therapeutic Exercise Time Entry: 8  Therapeutic Activity Time Entry: 21    Assessment:   Since pt. Was last seen she experienced a GLF.  Pt. Went to urgent care and had a CT scan done ofher brain which did not show any abnormal findings.  Pt. Demosntrates decreased safety awareness and has been non-compliant with her walker, although pt. Reports that since the fall she has been using the walker more. Further functional outcome measures performed today including 6MWT, pt. Ambulated total of 917ft. With rollator at .77 m/s.  The patient ambulates with increased safety, increased functional mobility speed with rollator compared to SBQC. Re-educated pt. That she should utilize rollator at all times. Time was spent establishing home program including large amplitude exercises, which were performed with unilateral UE support for safety.       Plan: review HEP       Current Problem  1. Unsteady gait  Follow Up In Physical Therapy          General: pt. Reports that nothing is new. No falls since she was here last.  Per pt.  who was present at end of session, the patient had a fall 2 weeks ago, she hit her head and had to get stiches.  She had fallen in the bathroom and was not using her walker at the time        Subjective    Precautions  Precautions  Precautions Comment: cardiac pacemaker, microencephaly, Complete heart block  Vital Signs     Pain  Pain Assessment  Pain Assessment: Melgar-Baker FACES  Melgar-Baker FACES Pain Rating: Hurts little more    Objective   Cognition: impaired        Outcome Measures:  Other Measures  6 min Walk Test: 917 ft. with rollator .77  m/s    Treatments:    There Act:  Functional mobility 917 ft. With rollator for 6MWT, educated pt. On utilizing walker at all times  Sit<>stands with no UE support x10 with PT demo and VC to scoot to edge of chair   Stair negotiation 3-6 inch stairs with reciprocal pattern to ascend and descend with SBA  Stair negotiation 3-6 inch stairs with reciprocal pattern to ascend and step-to pattern to descend with SBA, educated pt. On utilizing this to go up/down the stairs at her aunts house    Neuro Re-Ed:  Large amplitude forward stepping x10 on each side with unilateral UE support  Large amplitude lateral stepping x10 on each side with unilateral UE support  Backward stepping with each side with unilateral UE support, LOB x1 requiring min-A to maintain upright  Tap-ups with no UE support with CGA x10 each on L, R , reciprocal    PWR! Quadruped:  -posture x10  -twist x5 on each side  -WS x10  -step forward/back x10     There Ex:  Recumbent stepping bike with BUE's/BLE's L 2.0 for 8'x1 to improve cardiovascular endurance  Provided pt. With printed HEP handout      OP EDUCATION: educated pt. On utilizing walker at all times   Non-billable time HEP:  Large amplitude forward, lateral stepping, sit<>Stands       Goals:  Active       PT Problem       PT Goal 1       Start:  02/15/24    Expected End:  05/15/24       Pt. Will complete floor to standing transfer at mod-I level by end of POC for fall recovery.         PT Goal 2       Start:  02/15/24    Expected End:  05/15/24       Pt. Will improve 10MWT by .13 m/s to meet normative value for substantial meaningful change for geriatric population compared to normative values.         PT Goal 3       Start:  02/15/24    Expected End:  05/15/24       Pt. Will improve WAGNER to 45/56 by end of POC to meet fall risk cut off score compared to normative values         PT Goal 4       Start:  02/15/24    Expected End:  05/15/24       Pt. Will improve 30 second sit to stand test by 2  repetitions to meet MCID for significant change.         PT Goal 5       Start:  02/15/24    Expected End:  05/15/24       Pt. Will be independent in HEP in next 1-2 visits in order to promote self efficacy and reach other long term goals.

## 2024-03-11 ENCOUNTER — OFFICE VISIT (OUTPATIENT)
Dept: UROLOGY | Facility: CLINIC | Age: 54
End: 2024-03-11
Payer: MEDICARE

## 2024-03-11 VITALS
SYSTOLIC BLOOD PRESSURE: 138 MMHG | HEIGHT: 61 IN | TEMPERATURE: 97.6 F | BODY MASS INDEX: 20.77 KG/M2 | DIASTOLIC BLOOD PRESSURE: 83 MMHG | HEART RATE: 92 BPM | WEIGHT: 110 LBS

## 2024-03-11 DIAGNOSIS — Q02 MICROCEPHALY (MULTI): ICD-10-CM

## 2024-03-11 DIAGNOSIS — N39.41 URGE URINARY INCONTINENCE: Primary | ICD-10-CM

## 2024-03-11 DIAGNOSIS — K21.9 GASTRO-ESOPHAGEAL REFLUX DISEASE WITHOUT ESOPHAGITIS: ICD-10-CM

## 2024-03-11 DIAGNOSIS — N81.89 PELVIC FLOOR WEAKNESS: ICD-10-CM

## 2024-03-11 DIAGNOSIS — K59.01 SLOW TRANSIT CONSTIPATION: ICD-10-CM

## 2024-03-11 DIAGNOSIS — R32 URINARY INCONTINENCE, UNSPECIFIED TYPE: ICD-10-CM

## 2024-03-11 LAB
POC APPEARANCE, URINE: CLEAR
POC BILIRUBIN, URINE: NEGATIVE
POC BLOOD, URINE: NEGATIVE
POC COLOR, URINE: YELLOW
POC GLUCOSE, URINE: ABNORMAL MG/DL
POC KETONES, URINE: NEGATIVE MG/DL
POC LEUKOCYTES, URINE: NEGATIVE
POC NITRITE,URINE: NEGATIVE
POC PH, URINE: 5.5 PH
POC PROTEIN, URINE: NEGATIVE MG/DL
POC SPECIFIC GRAVITY, URINE: 1.02
POC UROBILINOGEN, URINE: 0.2 EU/DL

## 2024-03-11 PROCEDURE — 99204 OFFICE O/P NEW MOD 45 MIN: CPT | Performed by: OBSTETRICS & GYNECOLOGY

## 2024-03-11 PROCEDURE — 1036F TOBACCO NON-USER: CPT | Performed by: OBSTETRICS & GYNECOLOGY

## 2024-03-11 PROCEDURE — 81002 URINALYSIS NONAUTO W/O SCOPE: CPT | Performed by: OBSTETRICS & GYNECOLOGY

## 2024-03-11 RX ORDER — OMEPRAZOLE 40 MG/1
CAPSULE, DELAYED RELEASE ORAL
Qty: 90 CAPSULE | Refills: 3 | Status: SHIPPED | OUTPATIENT
Start: 2024-03-11

## 2024-03-11 ASSESSMENT — PAIN SCALES - GENERAL: PAINLEVEL: 0-NO PAIN

## 2024-03-11 NOTE — PROGRESS NOTES
Subjective   Patient ID: Sandra Gutiérrez is a 53 y.o. female who presents for No chief complaint on file..  HPI    53- year-old patient presenting as a referral from Dr. Zaidi with complaints of daytime frequency, urgency and incontinence.    The patient and her  present today.  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    The patient presents with daytime urinary urgency and frequency complaints. She denies any episodes of nocturia or enuresis. She voids every 1-2 hours during the day with episodes of incontinence in between. She has to wear liners and pads which she changes multiple times. She denies any leaking with laughing, coughing and sneezing. She denies any History of nephrolithiasis, gross hematuria or chronic recurrent UTIs.    She is sexually active but denies any vaginal complaints, no abnormal vaginal bleeding or discharge. She denies any vaginal dryness or irritation. She denies any bulge complaints, no pressure or pulling.    She complaints of consitpation, no fecal or flatal incontinence.    She has no other complaints.    Review of Systems  Constitutional: No fever, No chills and No fatigue.   Eyes: No vision problems and No dryness of the eyes.   ENT: No dry mouth, No hearing loss and No nosebleeds.   Cardiovascular: No chest pain, No palpitations and No orthopnea.   Respiratory: No shortness of breath, No cough and No wheezing.   Gastrointestinal: No abdominal pain, No constipation, No nausea, No diarrhea, No vomiting and No melena.   Genitourinary: As noted in HPI.   Musculoskeletal: No back pain, No myalgias, No muscle weakness, No joint swelling and No leg edema.   Integumentary: No rashes, No skin lesion and No itching.   Neurological: No headache, No numbness and No dizziness.   Psychiatric: No sleep disturbances, No anxiety and No depression.   Endocrine: No hot flashes, No loss of hair and No hirsutism.    Hematologic/Lymphatic: No swollen glands, No tendency for easy bleeding and No tendency for easy bruising.   All other systems have been reviewed and are negative for complaint.        Objective   Physical Exam  PHYSICAL EXAMINATION:  No LMP recorded. Patient is postmenopausal.  There is no height or weight on file to calculate BMI.  There were no vitals taken for this visit.  General Appearance: well appearing  Neuro: Alert and oriented   HEENT: mucous membranes moist, neck supple  Resp: No respiratory distress, normal work of breathing  MSK: normal range of motion, gait appropriate    Pelvic:  The patient had difficulty utilizing the stirrups and a frog position was used.  There were no external abnormalities.  However there was significant stool burden palpated posteriorly with difficulty even placing a gloved pinky.  There did not appear to be any pain over the cervix or anterior vagina.    Assessment/Plan   53-year-old with significant stool burden and constipation with daytime urge incontinence, Afghan-speaking status, and mental retardation.    #1 we discussed the patient's significant constipation concerns.  We discussed using her MiraLAX daily along with a fiber therapy.  We discussed using a fleets enema and possible titrating of magnesium citrate to relieve the symptoms.    2.  We discussed how her constipation can significantly affect her lower urinary tract complaints.  She has no nocturia or nighttime concerns but notes significant daytime urgency, frequency, and urge related incontinence.  We discussed a trial of Gemtesa given the patient's significant constipation history and cardiac history.  She was provided samples of this today.  We discussed following up in 4 to 6 weeks to discuss her lower urinary tract symptoms.    3.  The patient will follow-up in 4 to 6 weeks to discuss her lower urinary tract complaints and bowel related concerns.    MD Theodora Segura  Attestation  By signing my name below, I, Theodora Zamudio attest that this documentation has been prepared under the direction and in the presence of Kun Elliott MD. All medical record entries made by the Scribe were at my direction or personally dictated by me. I have reviewed the chart and agree that the record accurately reflects my personal performance of the history, physical exam, discussion and plan.

## 2024-03-11 NOTE — PATIENT INSTRUCTIONS
Please aggressively treat your constipation.  You can use MiraLAX daily along with an over-the-counter fiber like Metamucil, Citrucel, FiberCon, or fiber Gummies.    Should this be inadequate, please consider a Fleet enema or utilizing an alternative laxative like magnesium citrate.  This is also over-the-counter.    Please utilize your daily Gemtesa.  Please stop this medication should you have any bothersome side effects.    Please follow-up in 4 to 6 weeks to discuss your lower urinary tract and bowel related complaints.    Please contact the clinic with any questions or concerns.    [phone]

## 2024-03-18 ENCOUNTER — TREATMENT (OUTPATIENT)
Dept: PHYSICAL THERAPY | Facility: CLINIC | Age: 54
End: 2024-03-18
Payer: MEDICARE

## 2024-03-18 DIAGNOSIS — R26.81 UNSTEADY GAIT: ICD-10-CM

## 2024-03-18 PROCEDURE — 97110 THERAPEUTIC EXERCISES: CPT | Mod: GP | Performed by: PHYSICAL THERAPIST

## 2024-03-18 PROCEDURE — 97116 GAIT TRAINING THERAPY: CPT | Mod: GP | Performed by: PHYSICAL THERAPIST

## 2024-03-18 PROCEDURE — 97112 NEUROMUSCULAR REEDUCATION: CPT | Mod: GP | Performed by: PHYSICAL THERAPIST

## 2024-03-18 ASSESSMENT — PAIN SCALES - GENERAL: PAINLEVEL_OUTOF10: 0 - NO PAIN

## 2024-03-18 ASSESSMENT — PAIN - FUNCTIONAL ASSESSMENT: PAIN_FUNCTIONAL_ASSESSMENT: 0-10

## 2024-03-18 NOTE — PROGRESS NOTES
Physical Therapy    Physical Therapy Treatment    Patient Name: Sandra Gutiérrez  MRN: [id number]  Today's Date: 3/18/2024  Treatment visit: 3  [employer]  POC end date: 5/15/24  Time Calculation  Start Time: 1020  Stop Time: 1115  Time Calculation (min): 55 min  PT Therapeutic Procedures Time Entry  Neuromuscular Re-Education Time Entry: 35  Therapeutic Exercise Time Entry: 10  Gait Training Time Entry: 10    Assessment:   Reviewed HEP this session with good recall of exercises, but needing multiple cues for wide HARVEY with large amplitude stepping exercises for increased balance. Pt demonstrates decreased safety awareness and not using rollator for shorter distances, but recommended to use at all times. Performed stairs with both forward and lateral approach, and pt demonstrated increased balance and stability with lateral approach and BUE support compared to forward approach. Pt demonstrates core weakness and instability, especially during PWR! Quadruped exercises. Pt fatigued at end of session and only tolerated 2 mins on the recumbent stepping machine with a noted rise in HR after 2 mins and pt requiring rest break. Stopped endurance training due to HR increase and level of fatigue, however after a couple of minutes of sitting pt. Returned to baseline.  Pt will benefit from continued PT services to address deficits in balance, gait, and strength.     Plan: review HEP       Current Problem  1. Unsteady gait  Follow Up In Physical Therapy          General: pt. Reports that nothing is new. No falls since she was here last. Pt reports she is doing exercises at home.        Subjective    Precautions  Precautions  Precautions Comment: cardiac pacemaker, microencephaly, Complete heart block  Vital Signs  See treatment     Pain  Pain Assessment  Pain Assessment: 0-10  Pain Score: 0 - No pain    Objective   Cognition: impaired  Pt. Arrived to session with rollator walker            Treatments:    Gait Training:  -Stair  negotiation 4 inch lateral approach with BUE support x4 with CGA  -Stair negotiation 4 inch stairs with reciprocal pattern to ascend and descend with BUE support and CGA  -Stair negotiation 6 inch stairs with step-to pattern to ascend/descend with unilateral railing x2 with CGA  -Stair negotiation 6 inch with lateral approach and BUE support x2 with CGA    Neuro Re-Ed:  -Large amplitude forward stepping x10 on each side with unilateral UE support CGA  -Large amplitude lateral stepping x10 on each side with unilateral UE support CGA  -Backward stepping with each side with unilateral UE support x10 each side CGA    PWR! Quadruped: min-A for floor to mat transfer  -posture x10  -twist x10 on each side  -WS x10  -step forward/back modified x5 each side with LOB x1 requiring min-A to correct    There Ex:  -Recumbent stepping bike with BUE's/BLE's L 2.0 for 4'x1, pt. Stopping every minute due to fatigue, to improve cardiovascular endurance. HR increased from 68 to 112 and pt reported fatigue. Stopped exercise due to fatigue.  -Standing marches with BUE support 1x10 alternating R and L, 1x20 with 3sec hold   -Side stepping along counter with BUE support 2x10 each side  -Calf raises 1x20 with BUE support  Provided pt. With printed HEP handout      OP EDUCATION: educated pt. On utilizing walker at all times   Non-billable time HEP:  Large amplitude forward, lateral stepping, sit<>Stands  Standing marches with BUE support, Lateral stepping along counter with BUE support, calf raises with BUE support       KVNG Damon present and assisted with PT Evaluation & Treatment under the direct supervision of myself, the primary Physical Therapist.     Goals:  Active       PT Problem       PT Goal 1       Start:  02/15/24    Expected End:  05/15/24       Pt. Will complete floor to standing transfer at mod-I level by end of POC for fall recovery.         PT Goal 2       Start:  02/15/24    Expected End:  05/15/24       Pt. Will  improve 10MWT by .13 m/s to meet normative value for substantial meaningful change for geriatric population compared to normative values.         PT Goal 3       Start:  02/15/24    Expected End:  05/15/24       Pt. Will improve WAGNER to 45/56 by end of POC to meet fall risk cut off score compared to normative values         PT Goal 4       Start:  02/15/24    Expected End:  05/15/24       Pt. Will improve 30 second sit to stand test by 2 repetitions to meet MCID for significant change.         PT Goal 5       Start:  02/15/24    Expected End:  05/15/24       Pt. Will be independent in HEP in next 1-2 visits in order to promote self efficacy and reach other long term goals.

## 2024-03-28 ENCOUNTER — TREATMENT (OUTPATIENT)
Dept: PHYSICAL THERAPY | Facility: CLINIC | Age: 54
End: 2024-03-28
Payer: MEDICARE

## 2024-03-28 DIAGNOSIS — R26.81 UNSTEADINESS ON FEET: Primary | ICD-10-CM

## 2024-03-28 DIAGNOSIS — R26.81 UNSTEADY GAIT: ICD-10-CM

## 2024-03-28 PROCEDURE — 97110 THERAPEUTIC EXERCISES: CPT | Mod: GP | Performed by: PHYSICAL THERAPIST

## 2024-03-28 PROCEDURE — 97112 NEUROMUSCULAR REEDUCATION: CPT | Mod: GP | Performed by: PHYSICAL THERAPIST

## 2024-03-28 ASSESSMENT — PAIN SCALES - GENERAL: PAINLEVEL_OUTOF10: 0 - NO PAIN

## 2024-03-28 ASSESSMENT — PAIN - FUNCTIONAL ASSESSMENT: PAIN_FUNCTIONAL_ASSESSMENT: 0-10

## 2024-03-28 NOTE — PROGRESS NOTES
Physical Therapy    Physical Therapy Treatment    Patient Name: Sandra Gutiérrez  MRN: [id number]  Today's Date: 3/28/2024  Treatment visit: 4  [employer]  POC end date: 5/15/24  Time Calculation  Start Time: 1033  Stop Time: 1132  Time Calculation (min): 59 min  PT Therapeutic Procedures Time Entry  Neuromuscular Re-Education Time Entry: 49  Therapeutic Exercise Time Entry: 10    Assessment:   Patient demonstrating progress in balance and able to add new exercises this session. Pt able to perform floor transfer with only SBA today and PWR! Quadruped exercises with SBA->CGA with no LOB and increased core stability. VC given for reaching tall kneeling position on posture exercise. Pt challenged with stepping over hurdles and had multiple isidro catches requiring Troy to correct LOB.     Plan: review HEP       Current Problem  1. Unsteadiness on feet        2. Unsteady gait  Follow Up In Physical Therapy            General: pt. Reports nothing is new. No falls since she was here last. Pt reports that she has been doing PWR! Quadruped exercises at home. PT discussed with patient the importance of only doing exercises from her printed HEP for safety at home.        Subjective    Precautions  Precautions  Precautions Comment: cardiac pacemaker, microencephaly, Complete heart block  Vital Signs  See treatment     Pain  Pain Assessment  Pain Assessment: 0-10  Pain Score: 0 - No pain    Objective   Cognition: impaired  Pt. Arrived to session ambulating with rollator walker            Treatments:    Neuro Re-Ed:  -Large amplitude forward stepping onto foam x10 on each side with CGA  -Large amplitude lateral stepping onto foam x10 on each side with CGA  -Backward stepping over SPC x10 each side with CGA   -Sit to stand x10 CGA ->SBA and VC for anterior WS and     PWR! Quadruped: SBA for floor to mat transfer  -posture x10   -twist x10 on each side  -WS x10  -step forward/back modified x10 each side    -Forward stepping over 3  "hurdles spaced 3' apart x4 laps with CGA-Troy  -Forward stepping over 3 hurdles reciprocal pattern x4 laps with multiple isidro catches and CGA  -Lateral stepping over 3 hurdles spaced 3' apart x3 laps with CGA-Troy  -Forward step up/down 4\" step x10 each side unilateral UE support initially then progressing to no UE support with CGA->Troy  -Lateral step up/down 4\" step x10 each side with no UE support and CGA->min A    There Ex:  -Recumbent cycle bike with BUE's/BLE's L 1.0 for 4'x1, With seated rest break after 2mins, to improve cardiovascular endurance. HR increased from 60 to 110 and pt reported fatigue. Stopped exercise due to fatigue.  -Standing rows with GTB x10 with 3 second hold CGA->Troy to correct LOB in posterior direction  -Standing pulldowns with GTB x10 with 3 second hold CGA->Troy to correct LOB in posterior direction      OP EDUCATION: educated pt. On utilizing walker at all times   Non-billable time HEP:  Large amplitude forward, lateral stepping, sit<>Stands  Standing marches with BUE support, Lateral stepping along counter with BUE support, calf raises with BUE support       KVNG Damon present and assisted with PT Evaluation & Treatment under the direct supervision of myself, the primary Physical Therapist.     Goals:  Active       PT Problem       PT Goal 1       Start:  02/15/24    Expected End:  05/15/24       Pt. Will complete floor to standing transfer at mod-I level by end of POC for fall recovery.         PT Goal 2       Start:  02/15/24    Expected End:  05/15/24       Pt. Will improve 10MWT by .13 m/s to meet normative value for substantial meaningful change for geriatric population compared to normative values.         PT Goal 3       Start:  02/15/24    Expected End:  05/15/24       Pt. Will improve WAGNER to 45/56 by end of POC to meet fall risk cut off score compared to normative values         PT Goal 4       Start:  02/15/24    Expected End:  05/15/24       Pt. Will improve " 30 second sit to stand test by 2 repetitions to meet MCID for significant change.         PT Goal 5       Start:  02/15/24    Expected End:  05/15/24       Pt. Will be independent in HEP in next 1-2 visits in order to promote self efficacy and reach other long term goals.

## 2024-04-01 ENCOUNTER — TREATMENT (OUTPATIENT)
Dept: PHYSICAL THERAPY | Facility: CLINIC | Age: 54
End: 2024-04-01
Payer: MEDICARE

## 2024-04-01 DIAGNOSIS — R26.81 UNSTEADY GAIT: ICD-10-CM

## 2024-04-01 PROCEDURE — 97112 NEUROMUSCULAR REEDUCATION: CPT | Mod: GP | Performed by: PHYSICAL THERAPIST

## 2024-04-01 ASSESSMENT — PAIN - FUNCTIONAL ASSESSMENT: PAIN_FUNCTIONAL_ASSESSMENT: 0-10

## 2024-04-01 ASSESSMENT — PAIN SCALES - GENERAL: PAINLEVEL_OUTOF10: 0 - NO PAIN

## 2024-04-01 NOTE — PROGRESS NOTES
Physical Therapy    Physical Therapy Treatment    Patient Name: Sandra Gutiérrez  MRN: [id number]  Today's Date: 4/1/2024  Treatment visit: 5  [employer]  POC end date: 5/15/24  Time Calculation  Start Time: 1019  Stop Time: 1105  Time Calculation (min): 46 min  PT Therapeutic Procedures Time Entry  Neuromuscular Re-Education Time Entry: 46    Assessment:   Pt requiring frequent rest breaks this session due to SOB. Pt tolerated 3 mins on the recumbent bike before requiring seated rest break. HR initially reading 47bpm and increasing to 110bpm within 1 min of seated rest break. HR taken after standing exercises during session and recorded at 100-110bpm throughout session. Pt significantly challenged with backwards stepping off foam onto firm ground. Pt had multiple LOB requiring Troy to correct due to catching LE on foam. Pt performed backwards stepping over SPC instead with CGA. Pt performed blaze pod exercise today with UE tapping on mirror. Pt more challenged with 2 colors, requiring min-mod VC for red=RUE and blue=LUE. Pt with overall decreased safety awareness demonstrated by not using rollator at all times and decreased safety with transfer techniques.     Plan: resisted walking       Current Problem  1. Unsteady gait  Follow Up In Physical Therapy        General: pt. Reports nothing is new. No falls since she was here last.        Subjective    Precautions  Precautions  Precautions Comment: cardiac pacemaker, microencephaly, Complete heart block  Vital Signs  See treatment     Pain  Pain Assessment  Pain Assessment: 0-10  Pain Score: 0 - No pain    Objective   Cognition: impaired  Pt. Arrived to session ambulating with rollator walker            Treatments:    Neuro Re-Ed:  -Large amplitude forward stepping over LBQC onto foam x10 on each side with CGA  -Large amplitude lateral stepping over LBQC onto foam x10 on each side with CGA  -Backward stepping standing on foam to firm ground x10 R side with CGA->Troy.  Pt significantly challenged with exercise and performed the following exercise instead  -Large amplitude stepping backward over SPC x10 each side CGA    PWR! Quadruped: SBA for floor to mat transfer  -posture x10   -twist x10 on each side  -WS x10  -step forward/back modified x10 each side  Educated pt on only performing in living room near couch at home    -Blaze pods x4 on mirror, 2 up high, 2 down low, tapping with BUE's. CGA->Troy to correct LOBx2   1st bout: 23 reps with 1 color   2nd bout: 21 reps red=RUE, blue=LUE    There Ex: (combined with neuro re-ed for billing)  -Recumbent cycle bike with BUE's/BLE's L 1.0 for 6'x1, With seated rest break after 3mins, to improve cardiovascular endurance. HR ranged from . Pt experienced SOB and       OP EDUCATION: educated pt. On utilizing walker at all times   Non-billable time HEP:  Large amplitude forward, lateral stepping, sit<>Stands  Standing marches with BUE support, Lateral stepping along counter with BUE support, calf raises with BUE support       Kristine Stern, KVNG present and assisted with PT Evaluation & Treatment under the direct supervision of myself, the primary Physical Therapist.     Goals:  Active       PT Problem       PT Goal 1       Start:  02/15/24    Expected End:  05/15/24       Pt. Will complete floor to standing transfer at mod-I level by end of POC for fall recovery.         PT Goal 2       Start:  02/15/24    Expected End:  05/15/24       Pt. Will improve 10MWT by .13 m/s to meet normative value for substantial meaningful change for geriatric population compared to normative values.         PT Goal 3       Start:  02/15/24    Expected End:  05/15/24       Pt. Will improve WAGNER to 45/56 by end of POC to meet fall risk cut off score compared to normative values         PT Goal 4       Start:  02/15/24    Expected End:  05/15/24       Pt. Will improve 30 second sit to stand test by 2 repetitions to meet MCID for significant change.          PT Goal 5       Start:  02/15/24    Expected End:  05/15/24       Pt. Will be independent in HEP in next 1-2 visits in order to promote self efficacy and reach other long term goals.

## 2024-04-08 ENCOUNTER — TREATMENT (OUTPATIENT)
Dept: PHYSICAL THERAPY | Facility: CLINIC | Age: 54
End: 2024-04-08
Payer: MEDICARE

## 2024-04-08 DIAGNOSIS — R26.81 UNSTEADY GAIT: ICD-10-CM

## 2024-04-08 PROCEDURE — 97112 NEUROMUSCULAR REEDUCATION: CPT | Mod: GP | Performed by: PHYSICAL THERAPIST

## 2024-04-08 PROCEDURE — 97110 THERAPEUTIC EXERCISES: CPT | Mod: GP | Performed by: PHYSICAL THERAPIST

## 2024-04-08 ASSESSMENT — PAIN - FUNCTIONAL ASSESSMENT: PAIN_FUNCTIONAL_ASSESSMENT: 0-10

## 2024-04-08 ASSESSMENT — PAIN SCALES - GENERAL: PAINLEVEL_OUTOF10: 0 - NO PAIN

## 2024-04-08 NOTE — PROGRESS NOTES
"Physical Therapy    Physical Therapy Treatment    Patient Name: Sandra Gutiérrez  MRN: [id number]  Today's Date: 4/8/2024  Treatment visit: 6  [employer]  POC end date: 5/15/24  Time Calculation  Start Time: 1009  Stop Time: 1104  Time Calculation (min): 55 min  PT Therapeutic Procedures Time Entry  Neuromuscular Re-Education Time Entry: 47  Therapeutic Exercise Time Entry: 8    Assessment:   Pt tolerated session well with addition of new balance exercises today. Pt with good performance of blaze pod quadruped exercises promoting balance, core stability, and UE weight shifting. Pt was challenged with SLS required to complete standing blaze pod exercises. Pt using positive stepping strategies to correct LOB 50% of the time, and needed physical assist from PT 50% of the time to correct due to delayed stepping strategy. Pt with performed resisted walking this session with 7.5# of resistance.Max VC needed to perform exercise, and pt challenged with pausing after each step.     Plan: TB resisted step-ups, TB resistance training        Current Problem  1. Unsteady gait  Follow Up In Physical Therapy          General: pt. reports nothing is new. No falls since she was here last.        Subjective    Precautions  Precautions  Precautions Comment: cardiac pacemaker, microencephaly, Complete heart block  Vital Signs  See treatment     Pain  Pain Assessment  Pain Assessment: 0-10  Pain Score: 0 - No pain    Objective   Cognition: impaired  Pt. Arrived to session ambulating with rollator walker            Treatments:    Neuro Re-Ed:  -Large amplitude forward stepping over LBQC onto foam x10 on each side with CGA  -Large amplitude lateral stepping over LBQC onto foam x10 on each side with CGA  -Large amplitude stepping backward over SPC x10 each side CGA    SBA for floor to mat transfer for quadruped exercises:  -Blaze pod taps in quadruped UE only, x2 pods in front of pt's hands, x2 pods lateral to pt's knees, \"color catch\" " "red=RUE, blue=LUE 1'30\"x2 SBA and mod VC for red=right blue=left   1st bout: 27   2nd bout: 28  -Blaze pods in kneeling x2 on mirror to promote tall kneeling, x2 on floor lateral to pt, tapping with BUE's \"color catch\" 1 color 1'30\"x2 SBA   1st bout: 34   2nd bout: 27     -Blaze pod tap-ups with BLE. x2 pods on 6\" step \"color catch\" red=RLE blue=LLE 1'30\"x2 CGA->Troy   1st bout: 27   2nd bout: 32    -Resisted ambulation forward walking 7.5# 10' x3 CGA->Troy with max VC to pause after each step. Pt significantly challenged with this exercise and following cues to perform correctly    Ther Ex:   -Recumbent cycle bike with BUE's/BLE's L 1.0 for 8'x1, With seated rest break after 3mins, to improve cardiovascular endurance     OP EDUCATION: educated pt. On utilizing walker at all times   Non-billable time HEP:  Large amplitude forward, lateral stepping, sit<>Stands  Standing marches with BUE support, Lateral stepping along counter with BUE support, calf raises with BUE support       KVNG Damon present and assisted with PT Evaluation & Treatment under the direct supervision of myself, the primary Physical Therapist.     Goals:  Active       PT Problem       PT Goal 1       Start:  02/15/24    Expected End:  05/15/24       Pt. Will complete floor to standing transfer at mod-I level by end of POC for fall recovery.         PT Goal 2       Start:  02/15/24    Expected End:  05/15/24       Pt. Will improve 10MWT by .13 m/s to meet normative value for substantial meaningful change for geriatric population compared to normative values.         PT Goal 3       Start:  02/15/24    Expected End:  05/15/24       Pt. Will improve WAGNER to 45/56 by end of POC to meet fall risk cut off score compared to normative values         PT Goal 4       Start:  02/15/24    Expected End:  05/15/24       Pt. Will improve 30 second sit to stand test by 2 repetitions to meet MCID for significant change.         PT Goal 5       Start:  " 02/15/24    Expected End:  05/15/24       Pt. Will be independent in HEP in next 1-2 visits in order to promote self efficacy and reach other long term goals.

## 2024-04-10 ENCOUNTER — OFFICE VISIT (OUTPATIENT)
Dept: GASTROENTEROLOGY | Facility: CLINIC | Age: 54
End: 2024-04-10
Payer: MEDICARE

## 2024-04-10 VITALS — HEIGHT: 61 IN | HEART RATE: 94 BPM | BODY MASS INDEX: 20.39 KG/M2 | OXYGEN SATURATION: 96 % | WEIGHT: 108 LBS

## 2024-04-10 DIAGNOSIS — K59.81 CHRONIC PSEUDO-OBSTRUCTION OF COLON: Primary | ICD-10-CM

## 2024-04-10 DIAGNOSIS — R10.9 ABDOMINAL PAIN, UNSPECIFIED ABDOMINAL LOCATION: ICD-10-CM

## 2024-04-10 DIAGNOSIS — Z12.11 COLON CANCER SCREENING: ICD-10-CM

## 2024-04-10 PROCEDURE — 99214 OFFICE O/P EST MOD 30 MIN: CPT | Performed by: INTERNAL MEDICINE

## 2024-04-10 PROCEDURE — 1036F TOBACCO NON-USER: CPT | Performed by: INTERNAL MEDICINE

## 2024-04-10 ASSESSMENT — ENCOUNTER SYMPTOMS
FATIGUE: 0
CHILLS: 0
ARTHRALGIAS: 0
MYALGIAS: 0
HEADACHES: 0
ROS GI COMMENTS: AS PER HPI
UNEXPECTED WEIGHT CHANGE: 0
SHORTNESS OF BREATH: 0
FEVER: 0
DIFFICULTY URINATING: 0

## 2024-04-10 NOTE — PROGRESS NOTES
Assessment/Plan    Sandra Gutiérrez is a 53 y.o. female with PMHx of cerebral palsy, MRDD, HTN, Hirschsprung's disease s/p partial colectomy, chronic constipation, heart block s/p PPM who presents to GI clinic for evaluation prior to colonoscopy.    Chronic pseudo-obstruction of colon  She has chronic pseudoobstruction and chronic constipation, although her constipation symptoms are currently doing well symptomatically with daily MiraLAX + prune juice.  She has significant abdominal distention, but this is related to her chronic pseudoobstruction so more aggressive management of this would not be helpful.  She can continue her current bowel regimen.    Colon cancer screening  She is due for screening colonoscopy.  Due to presence of pacemaker, we would need to do her procedure in a hospital setting with MAC.  Due to her chronic constipation and chronic colonic pseudoobstruction we will do a 2-day extended bowel preparation.         Subjective     History of Present Illness   Sandra Gutiérrez is a 53 y.o. female with PMHx of cerebral palsy, MRDD, HTN, Hirschsprung's disease s/p partial colectomy, chronic constipation, heart block s/p PPM who presents to GI clinic for follow up.  Last seen around 2 years ago (July 2022) for hospital follow up for constipation and colonic pseudoobstruction.  At that time her bowels were better and she was passing daily stool, however had ongoing abdominal distention - likely a component of chronic pseudoobstruction.  Discussed screening colonoscopy (to be done at [facility] due to PPM), but this was not done.  She is present today with her  and   She needs colonoscopy, has pacemaker  Urologist gave her medication for bladder which helps  Taking MiraLAX and prune juice daily, moving her bowels regularly - no bowel complaints  No abdominal pain or blood in stools    Past history:  Currently using MiraLAX once daily, having 2-3 BMs per day, feels well  Used to take  "Dulcolax  Lost 10 pounds or so over the past 2 months  No medication changes  No blood in stools, no nausea or vomiting  Dr. Antunez had recommended colonoscopy, but this was not done due to heart issues  Currently she feels well  Was seen by GI service at [facility] in May due to \"colonic pseudo-obstruction,\" also seen by surgery  CT scan showed massive colonic distention due to stool with displacement of other organs, possible narrowing at the rectal anastomosis  GI recommended aggressive bowel regimen with Senna and 2-3 times daily MiraLAX, also used magnesium citrate  That admission also had dual-chamber pacemaker placed by cardiology due to heart block  EGD/colon ([facility]) for DELANEY 6/2013 showed colon anastomosis with sigmoid diverticulosis, otherwise both tests unremarkable    Review of Systems  Review of Systems   Constitutional:  Negative for chills, fatigue, fever and unexpected weight change.   Respiratory:  Negative for shortness of breath.    Cardiovascular:  Negative for chest pain.   Gastrointestinal:         As per HPI   Genitourinary:  Negative for difficulty urinating.   Musculoskeletal:  Positive for gait problem. Negative for arthralgias and myalgias.   Neurological:  Negative for headaches.   All other systems reviewed and are negative.      Allergies  No Known Allergies    Medications  Current Outpatient Medications   Medication Instructions    amLODIPine (Norvasc) 10 mg tablet 1 tablet, oral, Daily    atenolol (Tenormin) 25 mg tablet oral    atorvastatin (LIPITOR) 40 mg, oral, Daily    cholecalciferol (Vitamin D-3) 50 mcg (2,000 unit) capsule oral, Daily    ciclopirox 1 % shampoo use as needed    esomeprazole (NexIUM) 40 mg DR capsule oral    FaBB 2.2-25-1 mg tablet 1 tablet, oral, Daily    Klor-Con M20 20 mEq ER tablet 40 mEq, oral, Daily    omeprazole (PriLOSEC) 40 mg DR capsule TAKE 1 CAPSULE BY MOUTH EVERY DAY    polyethylene glycol (Glycolax) 17 gram/dose powder oral    sertraline (ZOLOFT) 25 mg, " Detail Level: Detailed "oral, Daily    vibegron 75 mg, oral, Daily        Objective     Visit Vitals  Pulse 94   Ht 1.549 m (5' 1\")   Wt 49 kg (108 lb)   SpO2 96%   BMI 20.41 kg/m²   OB Status Postmenopausal   Smoking Status Never   BSA 1.45 m²       Physical Exam  Constitutional:       General: She is not in acute distress.  Eyes:      Extraocular Movements: Extraocular movements intact.   Abdominal:      General: Abdomen is protuberant. Bowel sounds are decreased. There is distension.      Palpations: Abdomen is soft.      Tenderness: There is no abdominal tenderness. There is no guarding or rebound.      Comments: Tympanic to percussion   Skin:     General: Skin is warm and dry.   Neurological:      General: No focal deficit present.      Mental Status: She is alert.   Psychiatric:         Mood and Affect: Mood normal.         Behavior: Behavior normal.           Lab Results   Component Value Date    WBC 6.3 05/13/2022    WBC 6.1 05/11/2022    HGB 10.2 (L) 05/13/2022    HGB 11.4 (L) 05/11/2022    HCT 32.5 (L) 05/13/2022    HCT 36.8 05/11/2022    MCV 89 05/13/2022    MCV 90 05/11/2022     (L) 05/13/2022     05/11/2022     Lab Results   Component Value Date     05/13/2022     05/12/2022    K 3.0 (L) 05/13/2022    K 3.7 05/12/2022     05/13/2022     05/12/2022    CO2 31 05/13/2022    CO2 28 05/12/2022    BUN 4 (L) 05/13/2022    BUN 9 05/12/2022    CREATININE 0.51 05/13/2022    CREATININE 0.66 05/12/2022    CALCIUM 8.2 (L) 05/13/2022    CALCIUM 8.2 (L) 05/12/2022    PROT 7.3 05/06/2022    BILITOT 0.5 05/06/2022    ALKPHOS 116 (H) 05/06/2022    ALT 18 05/06/2022    AST 19 05/06/2022    GLUCOSE 90 05/13/2022    GLUCOSE 91 05/12/2022       Recent Imaging  No results found.      Brian Yang MD           " Quality 110: Preventive Care And Screening: Influenza Immunization: Influenza Immunization Administered during Influenza season Quality 431: Preventive Care And Screening: Unhealthy Alcohol Use - Screening: Patient not identified as an unhealthy alcohol user when screened for unhealthy alcohol use using a systematic screening method Quality 226: Preventive Care And Screening: Tobacco Use: Screening And Cessation Intervention: Patient screened for tobacco use, is a smoker AND received Cessation Counseling within measurement period or in the six months prior to the measurement period Quality 130: Documentation Of Current Medications In The Medical Record: Current Medications Documented

## 2024-04-10 NOTE — LETTER
April 10, 2024     Sandra Zaidi MD  [address] [facility] [address]    Patient: Sandra Gutiérrez   YOB: 1970   Date of Visit: 4/10/2024       Dear Dr. Sandra Zaidi MD:    I saw Sandra Gutiérrez in follow up today. Below are the relevant portions of my assessment and plan of care.    Assessment / Plan:    Chronic pseudo-obstruction of colon  She has chronic pseudoobstruction and chronic constipation, although her constipation symptoms are currently doing well symptomatically with daily MiraLAX + prune juice.  She has significant abdominal distention, but this is related to her chronic pseudoobstruction so more aggressive management of this would not be helpful.  She can continue her current bowel regimen.    Colon cancer screening  She is due for screening colonoscopy.  Due to presence of pacemaker, we would need to do her procedure in a hospital setting with MAC.  Due to her chronic constipation and chronic colonic pseudoobstruction we will do a 2-day extended bowel preparation.     If you have questions, please do not hesitate to reach out to me. I look forward to following Sandra along with you.         Sincerely,        Brian Yang MD        CC: No Recipients

## 2024-04-10 NOTE — ASSESSMENT & PLAN NOTE
She is due for screening colonoscopy.  Due to presence of pacemaker, we would need to do her procedure in a hospital setting with MAC.  Due to her chronic constipation and chronic colonic pseudoobstruction we will do a 2-day extended bowel preparation.

## 2024-04-10 NOTE — PATIENT INSTRUCTIONS
We will schedule you for a colonoscopy at [facility] with a 2-day extended bowel preparation.  Follow all of the bowel preparation instructions closely, and feel free to call the office with any questions or concerns prior to the procedure.

## 2024-04-12 ENCOUNTER — APPOINTMENT (OUTPATIENT)
Dept: CARDIOLOGY | Facility: HOSPITAL | Age: 54
End: 2024-04-12
Payer: MEDICARE

## 2024-04-12 DIAGNOSIS — I44.2 ATRIOVENTRICULAR BLOCK, COMPLETE (MULTI): Primary | ICD-10-CM

## 2024-04-12 DIAGNOSIS — Z95.0 PRESENCE OF CARDIAC PACEMAKER: ICD-10-CM

## 2024-04-18 PROCEDURE — G0179 MD RECERTIFICATION HHA PT: HCPCS | Performed by: INTERNAL MEDICINE

## 2024-04-19 ENCOUNTER — APPOINTMENT (OUTPATIENT)
Dept: GASTROENTEROLOGY | Facility: CLINIC | Age: 54
End: 2024-04-19
Payer: MEDICARE

## 2024-04-21 DIAGNOSIS — Z00.00 ENCOUNTER FOR GENERAL ADULT MEDICAL EXAMINATION WITHOUT ABNORMAL FINDINGS: ICD-10-CM

## 2024-04-21 DIAGNOSIS — E87.6 HYPOKALEMIA: ICD-10-CM

## 2024-04-22 ENCOUNTER — TREATMENT (OUTPATIENT)
Dept: PHYSICAL THERAPY | Facility: CLINIC | Age: 54
End: 2024-04-22
Payer: MEDICARE

## 2024-04-22 DIAGNOSIS — R26.81 UNSTEADY GAIT: ICD-10-CM

## 2024-04-22 PROCEDURE — 97112 NEUROMUSCULAR REEDUCATION: CPT | Mod: GP | Performed by: PHYSICAL THERAPIST

## 2024-04-22 PROCEDURE — 97110 THERAPEUTIC EXERCISES: CPT | Mod: GP | Performed by: PHYSICAL THERAPIST

## 2024-04-22 RX ORDER — POTASSIUM CHLORIDE 1500 MG/1
40 TABLET, EXTENDED RELEASE ORAL DAILY
Qty: 180 TABLET | Refills: 6 | Status: SHIPPED | OUTPATIENT
Start: 2024-04-22

## 2024-04-22 RX ORDER — ACETAMINOPHEN 500 MG
TABLET ORAL DAILY
Qty: 30 CAPSULE | Refills: 6 | Status: SHIPPED | OUTPATIENT
Start: 2024-04-22

## 2024-04-22 ASSESSMENT — PAIN SCALES - GENERAL: PAINLEVEL_OUTOF10: 0 - NO PAIN

## 2024-04-22 ASSESSMENT — PAIN - FUNCTIONAL ASSESSMENT: PAIN_FUNCTIONAL_ASSESSMENT: WONG-BAKER FACES

## 2024-04-22 NOTE — PROGRESS NOTES
"Physical Therapy    Physical Therapy Treatment    Patient Name: Sandra Gutiérrez  MRN: [id number]  Today's Date: 4/22/2024  Treatment visit: 7  [employer]  POC end date: 5/15/24  Time Calculation  Start Time: 1020  Stop Time: 1105  Time Calculation (min): 45 min  PT Therapeutic Procedures Time Entry  Neuromuscular Re-Education Time Entry: 10  Therapeutic Exercise Time Entry: 35    Assessment:   Patient with good tolerance to cardiovascular training today and able to complete 8 minutes on the recumbent cycle bike with no rest breaks. The patient demonstrated use of positive stepping strategies to maintain balance during large amplitude stepping onto foam. Patient with improved performance on sit to stands with GTB with cueing for scooting to edge of mat and anterior weight shift once standing. The patient was challenged with performance and sequencing of standing pallof press.     Plan: Standing ball toss        Current Problem  1. Unsteady gait  Follow Up In Physical Therapy            General: pt. reports nothing is new. No falls since she was here last.        Subjective    Precautions  Precautions  Precautions Comment: cardiac pacemaker, microencephaly, Complete heart block  Vital Signs  Not taken     Pain  Pain Assessment  Pain Assessment: Melgar-Baker FACES  Pain Score: 0 - No pain    Objective   Cognition: impaired  Pt. Arrived to session ambulating with rollator walker            Treatments:    Neuro Re-Ed:  -Large amplitude forward stepping over LBQC onto foam x10 alternating sides with CGA  -Large amplitude lateral stepping over LBQC onto foam x10 on each side with CGA  -Large amplitude stepping backward over SBQC x10 each side CGA    Ther Ex:   -Recumbent cycle bike with BUE's/BLE's L 1.0 for 8'x1 to improve cardiovascular endurance   -Forward step ups 4\" step R L x10 each side with CGA and VC for sequencing  -Sit to stands with horizontal ABD using GTB 2x10 with SBA and VC for anterior weight shift "   -Standing pallof press YTB 1x10 each side with max VC for proper technique   -Seated YTB Y's 2x10 with VC to keep upright posture   -Seated YTB ER 2x10 with VC to keep elbows at sides    OP EDUCATION: educated pt. On utilizing walker at all times   Non-billable time HEP:  Sit to stands with horizontal ABD using GTB  Large amplitude forward, lateral stepping, sit<>Stands  Standing marches with BUE support, Lateral stepping along counter with BUE support, calf raises with BUE support       KNVG Damon present and assisted with PT Evaluation & Treatment under the direct supervision of myself, the primary Physical Therapist.     Goals:  Active       PT Problem       PT Goal 1       Start:  02/15/24    Expected End:  05/15/24       Pt. Will complete floor to standing transfer at mod-I level by end of POC for fall recovery.         PT Goal 2       Start:  02/15/24    Expected End:  05/15/24       Pt. Will improve 10MWT by .13 m/s to meet normative value for substantial meaningful change for geriatric population compared to normative values.         PT Goal 3       Start:  02/15/24    Expected End:  05/15/24       Pt. Will improve WAGNER to 45/56 by end of POC to meet fall risk cut off score compared to normative values         PT Goal 4       Start:  02/15/24    Expected End:  05/15/24       Pt. Will improve 30 second sit to stand test by 2 repetitions to meet MCID for significant change.         PT Goal 5       Start:  02/15/24    Expected End:  05/15/24       Pt. Will be independent in HEP in next 1-2 visits in order to promote self efficacy and reach other long term goals.

## 2024-04-25 DIAGNOSIS — I10 PRIMARY HYPERTENSION: Primary | ICD-10-CM

## 2024-04-25 RX ORDER — AMLODIPINE BESYLATE 10 MG/1
10 TABLET ORAL DAILY
Qty: 90 TABLET | Refills: 2 | Status: SHIPPED | OUTPATIENT
Start: 2024-04-25 | End: 2024-07-24

## 2024-04-26 ENCOUNTER — OFFICE VISIT (OUTPATIENT)
Dept: CARDIOLOGY | Facility: HOSPITAL | Age: 54
End: 2024-04-26
Payer: MEDICARE

## 2024-04-26 ENCOUNTER — HOSPITAL ENCOUNTER (OUTPATIENT)
Dept: CARDIOLOGY | Facility: HOSPITAL | Age: 54
Discharge: HOME | End: 2024-04-26
Payer: MEDICARE

## 2024-04-26 VITALS
HEART RATE: 95 BPM | BODY MASS INDEX: 20.03 KG/M2 | DIASTOLIC BLOOD PRESSURE: 81 MMHG | HEIGHT: 61 IN | OXYGEN SATURATION: 98 % | WEIGHT: 106.1 LBS | SYSTOLIC BLOOD PRESSURE: 156 MMHG

## 2024-04-26 DIAGNOSIS — I44.2 ATRIOVENTRICULAR BLOCK, COMPLETE (MULTI): ICD-10-CM

## 2024-04-26 DIAGNOSIS — I44.2 COMPLETE HEART BLOCK (MULTI): ICD-10-CM

## 2024-04-26 DIAGNOSIS — Z95.0 PRESENCE OF CARDIAC PACEMAKER: ICD-10-CM

## 2024-04-26 PROCEDURE — 93280 PM DEVICE PROGR EVAL DUAL: CPT | Performed by: INTERNAL MEDICINE

## 2024-04-26 PROCEDURE — 93280 PM DEVICE PROGR EVAL DUAL: CPT

## 2024-04-26 PROCEDURE — 93005 ELECTROCARDIOGRAM TRACING: CPT | Mod: 59 | Performed by: NURSE PRACTITIONER

## 2024-04-26 PROCEDURE — 99214 OFFICE O/P EST MOD 30 MIN: CPT | Performed by: NURSE PRACTITIONER

## 2024-04-26 NOTE — PATIENT INSTRUCTIONS
It was so nice to meet you today!    Your ECG and device check look great!  Remote device check in 6mo.    Please continue with your current medication    Follow up with us in 1 year with device check.

## 2024-04-26 NOTE — PROGRESS NOTES
Chief Complaint:   Annual follow up     History Of Present Illness:    Sandra Gutiérrez is a 53 y.o. female    1. HTN  2. 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 Heart Block - this was detected in January of 2022 on a routine visit. She has no change in her symptoms. Her amlodipine was discontinued and atenolol started some time ago.      MARCH 2022: Her ECG showed complete heart block with junctional escape rhythm at 50 bpm.   [place] 20[place]: [place] [facility] [place] While she was admitted, EP was consulted for symptomatic high grade AVB and device was implanted.   MAY 12, 2022: Dual chamber PPM implanted by Darshan Erickson. Model: [employer] 2272 Assurity MRI Serial Number: [id number].     TESTING:       -MONITOR: [employer] 14 days (FEB-MAR 2022) Baseline 2nd degree AVB, Type 2 and 3rd degree AVB with junctional escape rhythm with avg HR 50.1 bpm. Bradycardia 89%. [id number] VEs ([place] 3%). No AF/AFL noted.       -ECHO: TTE (MAR 2022) LVEF 60-65%. No significant valve disease.    -Cardiac MRI: (MAR 2022) LVEF 58%. Negative for myocardial inflammation / edema / myocardial fibrosis/ infiltration / or infarction. Aortic Root and ascending thoracic aorta mildly dilated 3.6 cm.     TODAY routine annual follow up. She is feeling well with no discernible cardiac complaints. / is present with her today. Denies cp, sob, palpitations, LH/dizziness, syncope, orthopnea, LE edema, bleeding, fever/chills.  She is watching her sodium intake and staying compliant with her medications. She is following regularly with her PCP.  Device check today- no arrhythmias, device is functioning wnl.  ECG 4/26/24 Atrial sensed  84bpm      ROS:  Constitutional: not feeling poorly, no fever and no chills.   ENT: no nose bleeds  Cardiovascular: no chest pain, no tightness or heavy pressure, no shortness of breath, no palpitations, no lower extremity edema and as  "noted in HPI.   Respiratory: no cough, no shortness of breath during exertion, no PND, no orthopnea.   Gastrointestinal: no nausea, no vomiting, no melena  Genitourinary: no hematuria.   Musculoskeletal: no difficulty walking.   Neurological: no dizziness and no fainting.   Endocrine: no thyroid issues       Last Recorded Vitals:  Vitals:    04/26/24 0913   BP: 156/81   BP Location: Right arm   Patient Position: Sitting   BP Cuff Size: Adult   Pulse: 95   SpO2: 98%   Weight: 48.1 kg (106 lb 1.6 oz)   Height: 1.549 m (5' 1\")           Social History:  She reports that she has never smoked. She has never been exposed to tobacco smoke. She has never used smokeless tobacco. She reports that she does not drink alcohol and does not use drugs.    Family History:  No family history on file.     Allergies:  Patient has no known allergies.    Outpatient Medications:  Current Outpatient Medications   Medication Instructions    amLODIPine (NORVASC) 10 mg, oral, Daily    atenolol (Tenormin) 25 mg tablet oral    atorvastatin (LIPITOR) 40 mg, oral, Daily    cholecalciferol (Vitamin D-3) 50 mcg (2,000 unit) capsule oral, Daily    ciclopirox 1 % shampoo use as needed    esomeprazole (NexIUM) 40 mg DR capsule oral    FaBB 2.2-25-1 mg tablet 1 tablet, oral, Daily    omeprazole (PriLOSEC) 40 mg DR capsule TAKE 1 CAPSULE BY MOUTH EVERY DAY    polyethylene glycol (Glycolax) 17 gram/dose powder oral    potassium chloride CR (Klor-Con M20) 20 mEq ER tablet 40 mEq, oral, Daily    sertraline (ZOLOFT) 25 mg, oral, Daily    vibegron 75 mg, oral, Daily       Physical Exam:  Constitutional: alert and in no acute distress.   Eyes: no erythema, swelling or discharge from the eye .   Neck: neck is supple, symmetric, trachea midline, no masses .   Pulmonary: no increased work of breathing or signs of respiratory distress  and lungs clear to auscultation.    Cardiovascular:  JVP was normal, no thrills , regular rhythm, normal S1 and S2, no murmurs , " pedal pulses 2+ bilaterally  and no edema .   Abdomen: abdomen non-tender, no masses .   Skin: skin warm and dry, normal skin turgor .   Psychiatric judgment and insight is normal  and oriented to person, place and time .                 Last Labs:  CBC -  Lab Results   Component Value Date    WBC 6.3 05/13/2022    HGB 10.2 (L) 05/13/2022    HCT 32.5 (L) 05/13/2022    MCV 89 05/13/2022     (L) 05/13/2022       CMP -  Lab Results   Component Value Date    CALCIUM 8.2 (L) 05/13/2022    PROT 7.3 05/06/2022    ALBUMIN 4.9 05/06/2022    AST 19 05/06/2022    ALT 18 05/06/2022    ALKPHOS 116 (H) 05/06/2022    BILITOT 0.5 05/06/2022           RENAL FUNCTION PANEL -   Lab Results   Component Value Date    GLUCOSE 90 05/13/2022     05/13/2022    K 3.0 (L) 05/13/2022     05/13/2022    CO2 31 05/13/2022    ANIONGAP 10 05/13/2022    BUN 4 (L) 05/13/2022    CREATININE 0.51 05/13/2022    CALCIUM 8.2 (L) 05/13/2022    ALBUMIN 4.9 05/06/2022        Lab Results   Component Value Date     (H) 05/06/2022           Cardiac Imaging:  MR CARDIAC MORPHOLOGY AND FUNCTION W AND WO IV CONTRAST 05/10/2022  CMR Performed on 3 [employer] Scanner  1. Normal LV size (EDVi 59 ml/m2) and systolic function (LVEF 58%)   with no regional wall motion  abnormalities  2. No evidence of myocardial inflammation/edema on T2-weighted   imaging. No evidence of iron overload.  3. No evidence of myocardial fibrosis, infiltration or infarction   based on late gadolinium imaging.  4. The right hemidiaphragm is elevated with maximum hepatic dimension   of 15.9 cm. There is evidence of  massive distension of colonic segments noted on a prior CT performed   on 5/6/2022.      Assessment/Plan   Sandra Gutiérrez is a 53 y.o. female    1. HTN  2. 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 Heart Block - this was detected in January of 2022 on a routine visit. She has no change in her symptoms. Her  amlodipine was discontinued and atenolol started some time ago.      MARCH 2022: Her ECG showed complete heart block with junctional escape rhythm at 50 bpm.   MAY 2022: [place] [facility] [place]. While she was admitted, EP was consulted for symptomatic high grade AVB and device was implanted.   MAY 12, 2022: Dual chamber PPM implanted by Darshan Erickson. Model: [employer] 2272 Assurity MRI Serial Number: [id number].     TODAY routine annual follow up. She is feeling well with no discernible cardiac complaints. / is present with her today. She is watching her sodium intake and staying compliant with her medications. She is following regularly with her PCP.  Device check today- no arrhythmias, device is functioning wnl.  ECG 4/26/24 Atrial sensed  84bpm    PLAN  Continue with current medications  Continue with healthy lifestyle modifications  Follow up annually for EP and device check      ANGIE Damico-CNP

## 2024-04-29 LAB
ATRIAL RATE: 84 BPM
P AXIS: 79 DEGREES
P OFFSET: 129 MS
P ONSET: 70 MS
PR INTERVAL: 264 MS
Q ONSET: 202 MS
QRS COUNT: 14 BEATS
QRS DURATION: 148 MS
QT INTERVAL: 414 MS
QTC CALCULATION(BAZETT): 489 MS
QTC FREDERICIA: 462 MS
R AXIS: -43 DEGREES
T AXIS: 89 DEGREES
T OFFSET: 409 MS
VENTRICULAR RATE: 84 BPM

## 2024-05-02 ENCOUNTER — TREATMENT (OUTPATIENT)
Dept: PHYSICAL THERAPY | Facility: CLINIC | Age: 54
End: 2024-05-02
Payer: MEDICARE

## 2024-05-02 DIAGNOSIS — R26.81 UNSTEADY GAIT: ICD-10-CM

## 2024-05-02 DIAGNOSIS — R26.81 UNSTEADINESS ON FEET: Primary | ICD-10-CM

## 2024-05-02 PROCEDURE — 97110 THERAPEUTIC EXERCISES: CPT | Mod: GP | Performed by: PHYSICAL THERAPIST

## 2024-05-02 PROCEDURE — 97112 NEUROMUSCULAR REEDUCATION: CPT | Mod: GP | Performed by: PHYSICAL THERAPIST

## 2024-05-02 ASSESSMENT — PAIN - FUNCTIONAL ASSESSMENT: PAIN_FUNCTIONAL_ASSESSMENT: WONG-BAKER FACES

## 2024-05-02 ASSESSMENT — PAIN SCALES - GENERAL: PAINLEVEL_OUTOF10: 0 - NO PAIN

## 2024-05-02 NOTE — PROGRESS NOTES
Physical Therapy    Physical Therapy Treatment    Patient Name: Sandra Gutiérrez  MRN: [id number]  Today's Date: 5/2/2024  Treatment visit: 8  [employer]  POC end date: 5/15/24  Time Calculation  Start Time: 0931  Stop Time: 1018  Time Calculation (min): 47 min  PT Therapeutic Procedures Time Entry  Neuromuscular Re-Education Time Entry: 30  Therapeutic Exercise Time Entry: 17    Assessment:   Pt. Has not had any falls since starting physical therapy which indicates good functional improvement. Pt. demonstrated multiple LOB in forward direction requiring 1-2 steps for compensatory stepping strategies. However, pt. With delayed posterior stepping strategies with tendency to requiring 4+steps and min-A to maintain upright.  Pt. Performed well with dynamic balance with self ball toss.  If pt. Demonstrates loss of balance near objects pt. Has a tendency to reach for objects in the periphery.       Plan: compensatory stepping strategies       Primary dx. =unsteadiness on feet  Current Problem  1. Unsteadiness on feet        2. Unsteady gait  Follow Up In Physical Therapy            General: pt. reports nothing is new, no falls.  She is doing all of her exercises at home.          Subjective    Precautions  Precautions  Precautions Comment: cardiac pacemaker, microencephaly, Complete heart block  Vital Signs  Not taken     Pain  Pain Assessment  Pain Assessment: Melgar-Baker FACES  Pain Score: 0 - No pain    Objective   Cognition: impaired  Pt. Arrived to session ambulating with rollator walker            Treatments:    Neuro Re-Ed:  -Large amplitude forward stepping over LBQC onto foam x10 alternating sides with CGA with LOB posteriorly requiring min-A to maintain upright   -Large amplitude lateral stepping over LBQC onto foam x10 on each side with CGA  -Large amplitude stepping backward over SBQC x10 each side CGA  -forward step ups onto foam x10 reps on each side with CGA with multiple LOB with positive forward stepping  strategy  -sit<>stand with functional mobility 5'x1 with tap-up to 6 inch step and return to sitting with CGA  -self ball bounce/catch with pink stability ball 40' turning and then returning to mat table  -step up/down x2 XL river rocks with CGA with multiple LOB in posterior direction requiring min-A  -FA on firm surface with head turns while moving ball up/down, horizontal, diagonals x10 each     Ther Ex:   -Recumbent cycle bike with BUE's/BLE's L 1.0 for 8'x1 to improve cardiovascular endurance   -backwards monster walk with band on thighs 10'x3  -Lateral walkout with YTB on ankles 10'x2 with BUE support  -functional mobility in and out of bathroom at supervision level with rollator, PT ensured pt. Able to get in and out of restroom with automatic door button for safety      OP EDUCATION: educated pt. On utilizing walker at all times   Non-billable time HEP:  Sit to stands with horizontal ABD using GTB  Large amplitude forward, lateral stepping, sit<>Stands  Standing marches with BUE support, Lateral stepping along counter with BUE support, calf raises with BUE support         Goals:  Active       PT Problem       PT Goal 1       Start:  02/15/24    Expected End:  05/15/24       Pt. Will complete floor to standing transfer at mod-I level by end of POC for fall recovery.         PT Goal 2       Start:  02/15/24    Expected End:  05/15/24       Pt. Will improve 10MWT by .13 m/s to meet normative value for substantial meaningful change for geriatric population compared to normative values.         PT Goal 3       Start:  02/15/24    Expected End:  05/15/24       Pt. Will improve WAGNER to 45/56 by end of POC to meet fall risk cut off score compared to normative values         PT Goal 4       Start:  02/15/24    Expected End:  05/15/24       Pt. Will improve 30 second sit to stand test by 2 repetitions to meet MCID for significant change.         PT Goal 5       Start:  02/15/24    Expected End:  05/15/24       Pt.  Will be independent in HEP in next 1-2 visits in order to promote self efficacy and reach other long term goals.

## 2024-05-09 ENCOUNTER — TREATMENT (OUTPATIENT)
Dept: PHYSICAL THERAPY | Facility: CLINIC | Age: 54
End: 2024-05-09
Payer: MEDICARE

## 2024-05-09 DIAGNOSIS — R26.81 UNSTEADY GAIT: ICD-10-CM

## 2024-05-09 PROCEDURE — 97112 NEUROMUSCULAR REEDUCATION: CPT | Mod: GP | Performed by: PHYSICAL THERAPIST

## 2024-05-09 PROCEDURE — 97110 THERAPEUTIC EXERCISES: CPT | Mod: GP | Performed by: PHYSICAL THERAPIST

## 2024-05-09 ASSESSMENT — PAIN - FUNCTIONAL ASSESSMENT: PAIN_FUNCTIONAL_ASSESSMENT: 0-10

## 2024-05-09 ASSESSMENT — PAIN SCALES - GENERAL: PAINLEVEL_OUTOF10: 0 - NO PAIN

## 2024-05-09 NOTE — PROGRESS NOTES
Physical Therapy    Physical Therapy Treatment    Patient Name: Sandra Gutiérrez  MRN: [id number]  Today's Date: 5/9/2024  Treatment visit: 9  [employer]  POC end date: 5/15/24  Time Calculation  Start Time: 1020  Stop Time: 1105  Time Calculation (min): 45 min  PT Therapeutic Procedures Time Entry  Neuromuscular Re-Education Time Entry: 37  Therapeutic Exercise Time Entry: 8    Assessment:   Pt. Is showing overall plateau in progress. I was unable to progress Lunging exercises to stepping over isidro this date due to significant LOB requiring minimal assist to maintain upright.   Pt. Continues to demonstrate weakness in her core and lower extremities, pt. Requires compensation of locked knees into extension during sit to stands.  Ms. Gutiérrez occasionally demosntrates lateral stepping strategies however they are not consistent. Pt. Demonstrates significantly delayed posterior compensatory stepping strategies and often requires minimal assist to maintain upright when she looses her balance backwards.  Pt. Will be ready for discharge next visit. Encouraged pt. To utilize walker at all times to reduce fall risk.       Plan: discharge next visit       Primary dx. =unsteadiness on feet  Current Problem  1. Unsteady gait  Follow Up In Physical Therapy              General: pt. reports nothing is new, no falls.  She went for a walk yesterday with Dewayne,they walked for 2-3 hours.         Subjective    Precautions  Precautions  Precautions Comment: cardiac pacemaker, microencephaly, Complete heart block  Vital Signs  Not taken     Pain  Pain Assessment  Pain Assessment: 0-10  Pain Score: 0 - No pain    Objective   Cognition: impaired  Pt. Arrived to session ambulating with rollator walker            Treatments:    Neuro Re-Ed:  -trialed isidro step lunges forward however discontinued due to mulitple LOB requiring min-A to maintain upright  -Large amplitude forward stepping over LBQC onto foam x10 alternating sides with CGA  with LOB posteriorly requiring min-A to maintain upright   -Large amplitude lateral stepping over LBQC onto foam x10 on each side with CGA  -Large amplitude stepping backward over SBQC x10 each side CGA  -forward step up/downs onto foam x10 reps on each side with CGA with multiple LOB with positive forward stepping strategy  -obstacle course forward with foam, x1 LBQC, x2 hurdles and airex foam x2 laps with CGA->min-A  -self ball bounce/catch with pink stability ball 60' turning and then returning to mat table  -resisted walking with GTB around waist 30'x2   -step up/down 4 inch step with CGA->min-A x6 on each side  -sit<>stands with weighted ball push-out x10 with CGA    Ther Ex:   -Recumbent cycle bike with BUE's/BLE's L 1.0 for 8'x1 to improve cardiovascular endurance     OP EDUCATION: educated pt. On utilizing walker at all times   Non-billable time HEP:  Sit to stands with horizontal ABD using GTB  Large amplitude forward, lateral stepping, sit<>Stands  Standing marches with BUE support, Lateral stepping along counter with BUE support, calf raises with BUE support         Goals:  Active       PT Problem       PT Goal 1       Start:  02/15/24    Expected End:  05/15/24       Pt. Will complete floor to standing transfer at mod-I level by end of POC for fall recovery.         PT Goal 2       Start:  02/15/24    Expected End:  05/15/24       Pt. Will improve 10MWT by .13 m/s to meet normative value for substantial meaningful change for geriatric population compared to normative values.         PT Goal 3       Start:  02/15/24    Expected End:  05/15/24       Pt. Will improve WAGNER to 45/56 by end of POC to meet fall risk cut off score compared to normative values         PT Goal 4       Start:  02/15/24    Expected End:  05/15/24       Pt. Will improve 30 second sit to stand test by 2 repetitions to meet MCID for significant change.         PT Goal 5       Start:  02/15/24    Expected End:  05/15/24       Pt. Will  be independent in HEP in next 1-2 visits in order to promote self efficacy and reach other long term goals.

## 2024-05-13 ENCOUNTER — OFFICE VISIT (OUTPATIENT)
Dept: UROLOGY | Facility: CLINIC | Age: 54
End: 2024-05-13
Payer: MEDICARE

## 2024-05-13 VITALS — WEIGHT: 103 LBS | TEMPERATURE: 97.4 F | BODY MASS INDEX: 19.45 KG/M2 | HEIGHT: 61 IN

## 2024-05-13 DIAGNOSIS — N39.41 URGE URINARY INCONTINENCE: ICD-10-CM

## 2024-05-13 DIAGNOSIS — N81.89 PELVIC FLOOR WEAKNESS: ICD-10-CM

## 2024-05-13 DIAGNOSIS — Q02 MICROCEPHALY (MULTI): ICD-10-CM

## 2024-05-13 DIAGNOSIS — N39.41 URGE URINARY INCONTINENCE: Primary | ICD-10-CM

## 2024-05-13 DIAGNOSIS — K59.01 SLOW TRANSIT CONSTIPATION: ICD-10-CM

## 2024-05-13 LAB
POC APPEARANCE, URINE: CLEAR
POC BILIRUBIN, URINE: NEGATIVE
POC BLOOD, URINE: NEGATIVE
POC COLOR, URINE: YELLOW
POC GLUCOSE, URINE: NEGATIVE MG/DL
POC KETONES, URINE: NEGATIVE MG/DL
POC LEUKOCYTES, URINE: NEGATIVE
POC NITRITE,URINE: NEGATIVE
POC PH, URINE: 6.5 PH
POC PROTEIN, URINE: NORMAL MG/DL
POC SPECIFIC GRAVITY, URINE: 1.02
POC UROBILINOGEN, URINE: 0.2 EU/DL

## 2024-05-13 PROCEDURE — 99214 OFFICE O/P EST MOD 30 MIN: CPT | Performed by: OBSTETRICS & GYNECOLOGY

## 2024-05-13 PROCEDURE — 1036F TOBACCO NON-USER: CPT | Performed by: OBSTETRICS & GYNECOLOGY

## 2024-05-13 PROCEDURE — 81002 URINALYSIS NONAUTO W/O SCOPE: CPT | Performed by: OBSTETRICS & GYNECOLOGY

## 2024-05-13 ASSESSMENT — PAIN SCALES - GENERAL: PAINLEVEL: 0-NO PAIN

## 2024-05-13 NOTE — PATIENT INSTRUCTIONS
Please aggressively treat your constipation.  You can use MiraLAX daily along with an over-the-counter fiber like Metamucil, Citrucel, FiberCon, or fiber Gummies.    Should this be inadequate, please consider a Fleet enema or utilizing an alternative laxative like magnesium citrate.  This is also over-the-counter.    Please continue your daily Gemtesa.  Please contact the clinic should you have any issues with cost.    Please contact the clinic with any questions or concerns.    [phone]

## 2024-05-13 NOTE — PROGRESS NOTES
Subjective   Patient ID: Sandra Gutiérrez is a 54 y.o. female who presents for No chief complaint on file..  HPI  54-year-old with significant stool burden and constipation with daytime urge incontinence, Kittitian-speaking status, and mental retardation.    The patient has significant relief with her Gemtesa therapy, she wishes to continue this medication She denies any episodes of nocturia or enuresis. She voids every 2-3  hours during the day with episodes of incontinence in between.  She denies any UTI like symptoms.    She denies any vaginal complaints, no abnormal bleeding or discharge.     She denies any bowel related complaints, no fecal or flatal incontinence.    She has no other complaints.    From Previous note  53- year-old patient presenting as a referral from Dr. Zaidi with complaints of daytime frequency, urgency and incontinence.    The patient and her  present today.  She suffers from microcephaly and presents today with an  as she is a [place] speaker.    The patient presents with daytime urinary urgency and frequency complaints. She denies any episodes of nocturia or enuresis. She voids every 1-2 hours during the day with episodes of incontinence in between. She has to wear liners and pads which she changes multiple times. She denies any leaking with laughing, coughing and sneezing. She denies any History of nephrolithiasis, gross hematuria or chronic recurrent UTIs.    She is sexually active but denies any vaginal complaints, no abnormal vaginal bleeding or discharge. She denies any vaginal dryness or irritation. She denies any bulge complaints, no pressure or pulling.    She complaints of consitpation, no fecal or flatal incontinence.    She has no other complaints.    Review of Systems  Constitutional: No fever, No chills and No fatigue.   Eyes: No vision problems and No dryness of the eyes.   ENT: No dry mouth, No hearing loss and No nosebleeds.   Cardiovascular: No chest  pain, No palpitations and No orthopnea.   Respiratory: No shortness of breath, No cough and No wheezing.   Gastrointestinal: No abdominal pain, No constipation, No nausea, No diarrhea, No vomiting and No melena.   Genitourinary: As noted in HPI.   Musculoskeletal: No back pain, No myalgias, No muscle weakness, No joint swelling and No leg edema.   Integumentary: No rashes, No skin lesion and No itching.   Neurological: No headache, No numbness and No dizziness.   Psychiatric: No sleep disturbances, No anxiety and No depression.   Endocrine: No hot flashes, No loss of hair and No hirsutism.   Hematologic/Lymphatic: No swollen glands, No tendency for easy bleeding and No tendency for easy bruising.   All other systems have been reviewed and are negative for complaint.        Objective   Physical Exam  PHYSICAL EXAMINATION:  No LMP recorded. Patient is postmenopausal.  There is no height or weight on file to calculate BMI.  There were no vitals taken for this visit.  General Appearance: well appearing  Neuro: Alert and oriented   HEENT: mucous membranes moist, neck supple  Resp: No respiratory distress, normal work of breathing  MSK: normal range of motion, gait appropriate      Assessment/Plan   54-year-old with significant stool burden and constipation with daytime urge incontinence, Vincentian-speaking status, and mental retardation.    #1 we again discussed the patient's significant constipation concerns.  We discussed using her MiraLAX daily along with a fiber therapy.  We discussed using a fleets enema and possible titrating of magnesium citrate to relieve the symptoms.    2.  We discussed how her constipation can significantly affect her lower urinary tract complaints.  She has no nocturia or nighttime concerns but notes significant daytime urgency, frequency, and urge related incontinence.  Patient having excellent results with her Gemtesa therapy and a new prescription was called into her pharmacy.  Given the  patient's significant constipation history and cardiac history we discussed this is the only safe oral option.      3.  The patient will follow-up yearly moving forward.  She will contact the clinic should there be issues with cost associated with her Gemtesa therapy and we will work on a prior authorization or tier exemption.    URSZULA Elliott MD          Scribe Attestation  By signing my name below, IRosemary Scribcristina attest that this documentation has been prepared under the direction and in the presence of Kun Elliott MD. All medical record entries made by the Scribe were at my direction or personally dictated by me. I have reviewed the chart and agree that the record accurately reflects my personal performance of the history, physical exam, discussion and plan.

## 2024-05-16 ENCOUNTER — TREATMENT (OUTPATIENT)
Dept: PHYSICAL THERAPY | Facility: CLINIC | Age: 54
End: 2024-05-16
Payer: MEDICARE

## 2024-05-16 DIAGNOSIS — R26.81 UNSTEADINESS ON FEET: Primary | ICD-10-CM

## 2024-05-16 DIAGNOSIS — Z91.81 AT RISK FOR FALLS: ICD-10-CM

## 2024-05-16 PROCEDURE — 97530 THERAPEUTIC ACTIVITIES: CPT | Mod: GP | Performed by: PHYSICAL THERAPIST

## 2024-05-16 PROCEDURE — 97112 NEUROMUSCULAR REEDUCATION: CPT | Mod: GP | Performed by: PHYSICAL THERAPIST

## 2024-05-16 ASSESSMENT — BALANCE ASSESSMENTS
PICK UP OBJECT FROM THE FLOOR FROM A STANDING POSITION: ABLE TO PICK UP SLIPPER BUT NEEDS SUPERVISION
STANDING UNSUPPORTED WITH FEET TOGETHER: ABLE TO PLACE FEET TOGETHER INDEPENDENTLY AND STAND 1 MINUTE SAFELY
TRANSFERS: ABLE TO TRANSFER WITH VERBAL CUEING AND/OR SUPERVISION
LONG VERSION TOTAL SCORE (MAX 56): 37
REACHING FORWARD WITH OUTSTRETCHED ARM WHILE STANDING: CAN REACH FORWARD 5 CM (2 INCHES)
STANDING TO SITTING: CONTROLS DESCENT BY USING HANDS
STANDING TO SITTING: ABLE TO STAND INDEPENDENTLY USING HANDS
PLACE ALTERNATE FOOT ON STEP OR STOOL WHILE STANDING UNSUPPORTED: ABLE TO COMPLETE GREATER THAN 2 STEPS NEEDS MINIMAL ASSIST
TURN 360 DEGREES: ABLE TO TURN 360 DEGREES SAFELY BUT SLOWLY
SITTING WITH BACK UNSUPPORTED BUT FEET SUPPORTED ON FLOOR OR ON A STOOL: ABLE TO SIT SAFELY AND SECURELY FOR 2 MINUTES
PLACE ALTERNATE FOOT ON STEP OR STOOL WHILE STANDING UNSUPPORTED: TURNS SIDEWAYS ONLY BUT MAINTAINS BALANCE
STANDING UNSUPPORTED: ABLE TO STAND SAFELY FOR 2 MINUTES
STANDING ON ONE LEG: TRIES TO LIFT LEG UNABLE TO HOLD 3 SECONDS BUT REMAINS STANDING INDEPENDENTLY
STANDING UNSUPPORTED WITH EYES CLOSED: ABLE TO STAND 10 SECONDS SAFELY
STANDING UNSUPPORTED ONE FOOT IN FRONT: ABLE TO TAKE SMALL STEP INDEPENDENTLY AND HOLD 30 SECONDS

## 2024-05-16 ASSESSMENT — PAIN SCALES - GENERAL: PAINLEVEL_OUTOF10: 0 - NO PAIN

## 2024-05-16 ASSESSMENT — PAIN - FUNCTIONAL ASSESSMENT: PAIN_FUNCTIONAL_ASSESSMENT: 0-10

## 2024-05-16 NOTE — PROGRESS NOTES
Physical Therapy    Physical Therapy Treatment/Discharge     Patient Name: Sandra Gutiérrez  MRN: [id number]  Today's Date: 5/20/2024  Treatment visit: 10  [employer]  POC end date: 5/15/24     PT Therapeutic Procedures Time Entry  Neuromuscular Re-Education Time Entry: 12  Therapeutic Activity Time Entry: 30    Assessment:   Ms. Gutiérrez has made good progress with her functinoal strength, balance and functional mobility speed since starting physical therapy.  Ms. Gutiérrez has chronic weakness due congential abnormalities.  Overall the patient has responded well to therapy and has not suffered any falls since the beginning of this physical therapy POC on 2/15/24 and prior to therapy pt. Had multipel falls in the past 6 months.  Ms. Gutiérrez improved her score on the 30 second sit to stand test by 2 repetitions, meeting her long goal.  The patient patient improved her speed on the TUG with no device improving from 16 seconds to 12.87 seconds today (3.1 second improvement).  The patient also improved on the 10MWT by 0.24 m/s exceeding LTG and MCID of .13 m/s.  Ms. Callejas improved on her score on the WAGNER from a 30/56 to a 37/56, although she did not meet the MCID and LTG, the patient improved on her static balance and ability to turn safely.  The patient has improved her balance, however the patient is still scoring at a risk for falls on the WAGNER and has decreased safety awareness, therefore I recommended that the patient utilize a walker at all times. At this time the patient has reached her maximal potential. Discussed a balance tune up in 6 months to 1 year.       Plan: discharged       Primary dx. =unsteadiness on feet  Current Problem  1. Unsteadiness on feet        2. At risk for falls                  Subjective    pt. reports nothing is new, no falls.  She went sachin walk yesterday outside    Precautions: pacemaker, complete heart block, microencephaly     Vital Signs  Not taken     Pain: N/A       Objective    Cognition: impaired  Pt. Arrived to session ambulating with rollator walker    TU24=15.7 seconds with rollator, 12.87 seconds with no device with CGA, 9.9 sec with rollator with supervision assist  IE=16 seconds with no device with CGA->min-A  IE=17 seconds with SBQC with SBA      10MWT:  24=12 seconds with no device with CGA, (.83)  IE=16.69 seconds with SBQC with SBA  (.59 ms/)  IE=12.13 seconds with rollator walker at supervision level      30sec.sit<>stand test:   24=11 reps with no UE support, utilizing legs against back of chair  IE=9 reps with no UE support, utilizing back of legs against chair    5x sit<>stand test:   24=14 sec. With no UE support, utilizing legs against chair  IE=16 sec. with no UE support, utilizing back of legs against chair     WAGNER=37/56  IE=30/56 (see flowsheet)    2KZS=1971 ft. With rollator with supervision assist  3/4/23=212 ft. With rollator with SBA            Treatments:    Neuro Re-Ed:  Wagner Balance Scale test performed with SBA throughout  1.Sitting to standing   2.Standing unsupported 2 min.  3.Sitting with back unsupported but feet supported on floor or on a stool 2 min.   4.Standing to sitting   5.Transfers=   6.Standing unsupported with eyes closed= 10 sec.  7.Standing unsupported with feet together=1 min.  8.Reaching forward with outstretched arm while standing= 10 cm  9. object from the floor from a standing position=   10. Turning to look behind over left and right shoulders while standing=   11. Turn 360 degrees=   12. Place alternate foot on step or stool while standing unsupported=  13. Standing unsupported on foot in front  14. Standing on one leg=     Reviewed score on WAGNER, discussed fall risk cut off score, educated pt. On utilization of device due to score being below cut off score of 45/56.     There Act:  Sit<>stands with no UE support x11 reps   Functional mobility 20 ft. X1 with SBQC with SBA  Functional mobility  20'x1 x1 with no device with CGA  Functional mobilty 30'x`1 with rollator walker with supervision assist   Functional mobility 30'x1 with no device with CGA    OP EDUCATION: educated pt. On utilizing walker at all times   Non-billable time HEP:  Sit to stands with horizontal ABD using GTB  Large amplitude forward, lateral stepping, sit<>Stands  Standing marches with BUE support, Lateral stepping along counter with BUE support, calf raises with BUE support         Goals:  Active       PT Problem       PT Goal 1 (Not met)       Start:  02/15/24    Expected End:  05/15/24    Resolved:  05/20/24    Pt. Will complete floor to standing transfer at mod-I level by end of POC for fall recovery.      Goal Note       Pt. Requires supervision for safety              PT Goal 2 (Met)       Start:  02/15/24    Expected End:  05/15/24    Resolved:  05/20/24    Pt. Will improve 10MWT by .13 m/s to meet normative value for substantial meaningful change for geriatric population compared to normative values.      Goal Note       GOAL MET ( 0.24 m/s improvement)              PT Goal 3 (Progressing)       Start:  02/15/24    Expected End:  05/15/24       Pt. Will improve WAGNER to 45/56 by end of POC to meet fall risk cut off score compared to normative values         PT Goal 4 (Met)       Start:  02/15/24    Expected End:  05/15/24    Resolved:  05/20/24    Pt. Will improve 30 second sit to stand test by 2 repetitions to meet MCID for significant change.         PT Goal 5 (Met)       Start:  02/15/24    Expected End:  05/15/24    Resolved:  05/20/24    Pt. Will be independent in HEP in next 1-2 visits in order to promote self efficacy and reach other long term goals.                       Two to four times a month

## 2024-06-12 ENCOUNTER — ANESTHESIA EVENT (OUTPATIENT)
Dept: GASTROENTEROLOGY | Facility: HOSPITAL | Age: 54
End: 2024-06-12

## 2024-06-12 NOTE — ANESTHESIA PREPROCEDURE EVALUATION
Patient: Sandra Gutiérrez    Procedure Information       Date/Time: 06/13/24 0730    Scheduled providers: Brian Yang MD    Procedure: COLONOSCOPY    Location: [facility]            Relevant Problems   Cardiac   (+) Complete heart block (Multi)   (+) Peripheral vascular disease, unspecified (CMS-HCC)      Neuro   (+) Depression, recurrent (CMS-HCC)   (+) Major depressive disorder, recurrent, moderate (Multi)       Clinical information reviewed:                    Past Medical History:   Diagnosis Date    Abnormal finding of blood chemistry, unspecified     Abnormal blood chemistry    Age-related osteoporosis without current pathological fracture 12/05/2013    Osteoporosis    Altered mental status, unspecified     Mental status change    Gastroparesis     Gastroparesis    Personal history of other diseases of the circulatory system     History of hypertension    Personal history of other diseases of the circulatory system     Personal history of coronary atherosclerosis    Personal history of other diseases of the digestive system     History of gastroesophageal reflux (GERD)    Personal history of other diseases of the musculoskeletal system and connective tissue     History of backache    Personal history of other endocrine, nutritional and metabolic disease     History of hyperlipidemia    Personal history of other endocrine, nutritional and metabolic disease     History of hypercholesterolemia    Personal history of other endocrine, nutritional and metabolic disease 03/10/2014    History of iron deficiency    Personal history of other specified conditions     History of shortness of breath    Vitamin B12 deficiency anemia, unspecified     B12 deficiency anemia    Vitamin D deficiency, unspecified     Vitamin D deficiency      Past Surgical History:   Procedure Laterality Date    COLONOSCOPY  02/11/2014    Complete Colonoscopy    OTHER SURGICAL HISTORY  02/11/2014    Upper Gastrointestinal  "Endoscopy (Therapeutic)     Social History     Tobacco Use    Smoking status: Never     Passive exposure: Never    Smokeless tobacco: Never   Vaping Use    Vaping status: Never Used   Substance Use Topics    Alcohol use: Never    Drug use: Never      Current Outpatient Medications   Medication Instructions    amLODIPine (NORVASC) 10 mg, oral, Daily    atenolol (Tenormin) 25 mg tablet oral    atorvastatin (LIPITOR) 40 mg, oral, Daily    cholecalciferol (Vitamin D-3) 50 mcg (2,000 unit) capsule oral, Daily    ciclopirox 1 % shampoo use as needed    esomeprazole (NexIUM) 40 mg DR capsule oral    FaBB 2.2-25-1 mg tablet 1 tablet, oral, Daily    omeprazole (PriLOSEC) 40 mg DR capsule TAKE 1 CAPSULE BY MOUTH EVERY DAY    polyethylene glycol (Glycolax) 17 gram/dose powder oral    potassium chloride CR (Klor-Con M20) 20 mEq ER tablet 40 mEq, oral, Daily    sertraline (ZOLOFT) 25 mg, oral, Daily    vibegron 75 mg, oral, Daily      No Known Allergies     Chemistry    Lab Results   Component Value Date/Time     05/13/2022 0507    K 3.0 (L) 05/13/2022 0507     05/13/2022 0507    CO2 31 05/13/2022 0507    BUN 4 (L) 05/13/2022 0507    CREATININE 0.51 05/13/2022 0507    Lab Results   Component Value Date/Time    CALCIUM 8.2 (L) 05/13/2022 0507    ALKPHOS 116 (H) 05/06/2022 1600    AST 19 05/06/2022 1600    ALT 18 05/06/2022 1600    BILITOT 0.5 05/06/2022 1600          No results found for: \"HGBA1C\"  Lab Results   Component Value Date/Time    WBC 6.3 05/13/2022 0507    HGB 10.2 (L) 05/13/2022 0507    HCT 32.5 (L) 05/13/2022 0507     (L) 05/13/2022 0507     No results found for: \"PROTIME\", \"PTT\", \"INR\"  No results found for: \"ABORH\"  Encounter Date: 04/26/24   ECG 12 lead (Clinic Performed)   Result Value    Ventricular Rate 84    Atrial Rate 84    IA Interval 264    QRS Duration 148    QT Interval 414    QTC Calculation(Bazett) 489    P Axis 79    R Axis -43    T Axis 89    QRS Count 14    Q Onset 202    P Onset " 70    P Offset 129    T Offset 409    QTC Fredericia 462    Narrative    Atrial-sensed ventricular-paced rhythm with prolonged AV conduction  Abnormal ECG  When compared with ECG of 27-MAY-2022 14:37,  Vent. rate has increased BY   8 BPM     No results found for this or any previous visit from the past 1095 days.     Echo 3/25/2022:  Left Ventricle: The left ventricular systolic function is normal, with an estimated ejection fraction of 60-65%. There are no regional wall motion abnormalities. The left ventricular cavity size is normal. The left ventricular septal wall thickness is normal. There is normal left ventricular posterior wall thickness. Left ventricular diastolic filling was indeterminate. IVSd likely overdrawn. Diastolic function indeterminate in setting of complete AV block.  Left Atrium: The left atrium is normal in size.  Right Ventricle: The right ventricle is normal in size. There is normal right ventricular global systolic function.  Right Atrium: The right atrium is normal in size.  Aortic Valve: The aortic valve is trileaflet. The aortic valve dimensionless index is 0.79. There is mild aortic valve regurgitation. The peak instantaneous gradient of the aortic valve is 9.0 mmHg. The mean gradient of the aortic valve is 5.0 mmHg.  Mitral Valve: The mitral valve is mildly thickened. There is trace to mild mitral valve regurgitation.  Tricuspid Valve: The tricuspid valve was not well visualized. There is trace tricuspid regurgitation.  Pulmonic Valve: The pulmonic valve is structurally normal. There is mild pulmonic valve regurgitation.  Pericardium: There is a trivial pericardial effusion.  Aorta: The aortic root is normal.  In comparison to the previous echocardiogram(s): There are no prior studies on this patient for comparison purposes.     CONCLUSIONS:   1. The left ventricular systolic function is normal with a 60-65% estimated ejection fraction.   2. There is mild aortic valve  regurgitation.    Visit Vitals  OB Status Postmenopausal   Smoking Status Never     No data recorded     Physical Exam     Anesthesia Plan

## 2024-06-13 ENCOUNTER — APPOINTMENT (OUTPATIENT)
Dept: GASTROENTEROLOGY | Facility: HOSPITAL | Age: 54
End: 2024-06-13
Payer: MEDICARE

## 2024-06-13 ENCOUNTER — ANESTHESIA (OUTPATIENT)
Dept: GASTROENTEROLOGY | Facility: HOSPITAL | Age: 54
End: 2024-06-13
Payer: MEDICARE

## 2024-07-03 ENCOUNTER — APPOINTMENT (OUTPATIENT)
Dept: PRIMARY CARE | Facility: CLINIC | Age: 54
End: 2024-07-03
Payer: MEDICARE

## 2024-07-03 DIAGNOSIS — E78.2 MIXED HYPERLIPIDEMIA: ICD-10-CM

## 2024-07-03 DIAGNOSIS — E03.9 HYPOTHYROIDISM, UNSPECIFIED TYPE: ICD-10-CM

## 2024-07-03 DIAGNOSIS — R53.81 MALAISE AND FATIGUE: ICD-10-CM

## 2024-07-03 DIAGNOSIS — R53.83 MALAISE AND FATIGUE: ICD-10-CM

## 2024-07-03 DIAGNOSIS — Z00.00 ANNUAL PHYSICAL EXAM: ICD-10-CM

## 2024-07-03 DIAGNOSIS — R73.9 HYPERGLYCEMIA, UNSPECIFIED: ICD-10-CM

## 2024-07-03 DIAGNOSIS — E53.8 VITAMIN B12 DEFICIENCY: ICD-10-CM

## 2024-07-03 DIAGNOSIS — Z13.9 SCREENING DUE: Primary | ICD-10-CM

## 2024-07-03 DIAGNOSIS — E55.9 VITAMIN D DEFICIENCY: ICD-10-CM

## 2024-07-03 DIAGNOSIS — E78.5 HYPERLIPIDEMIA, UNSPECIFIED HYPERLIPIDEMIA TYPE: ICD-10-CM

## 2024-07-03 DIAGNOSIS — D50.9 IRON DEFICIENCY ANEMIA, UNSPECIFIED IRON DEFICIENCY ANEMIA TYPE: ICD-10-CM

## 2024-07-03 LAB
FERRITIN SERPL-MCNC: 18 NG/ML (ref 8–150)
HCV AB SER QL: NONREACTIVE
IRON SATN MFR SERPL: 8 % (ref 25–45)
IRON SERPL-MCNC: 35 UG/DL (ref 35–150)
TIBC SERPL-MCNC: 461 UG/DL (ref 240–445)
UIBC SERPL-MCNC: 426 UG/DL (ref 110–370)

## 2024-07-03 PROCEDURE — 84443 ASSAY THYROID STIM HORMONE: CPT | Performed by: INTERNAL MEDICINE

## 2024-07-03 PROCEDURE — 99213 OFFICE O/P EST LOW 20 MIN: CPT | Performed by: INTERNAL MEDICINE

## 2024-07-03 PROCEDURE — 82607 VITAMIN B-12: CPT | Performed by: INTERNAL MEDICINE

## 2024-07-03 PROCEDURE — 83550 IRON BINDING TEST: CPT

## 2024-07-03 PROCEDURE — 80053 COMPREHEN METABOLIC PANEL: CPT | Performed by: INTERNAL MEDICINE

## 2024-07-03 PROCEDURE — 36415 COLL VENOUS BLD VENIPUNCTURE: CPT

## 2024-07-03 PROCEDURE — 1036F TOBACCO NON-USER: CPT | Performed by: INTERNAL MEDICINE

## 2024-07-03 PROCEDURE — 86803 HEPATITIS C AB TEST: CPT

## 2024-07-03 PROCEDURE — 85025 COMPLETE CBC W/AUTO DIFF WBC: CPT | Performed by: INTERNAL MEDICINE

## 2024-07-03 PROCEDURE — 82728 ASSAY OF FERRITIN: CPT

## 2024-07-03 PROCEDURE — 82306 VITAMIN D 25 HYDROXY: CPT | Performed by: INTERNAL MEDICINE

## 2024-07-03 PROCEDURE — 80061 LIPID PANEL: CPT | Performed by: INTERNAL MEDICINE

## 2024-07-03 PROCEDURE — 83540 ASSAY OF IRON: CPT

## 2024-07-03 PROCEDURE — 83036 HEMOGLOBIN GLYCOSYLATED A1C: CPT | Performed by: INTERNAL MEDICINE

## 2024-07-03 ASSESSMENT — PAIN SCALES - GENERAL: PAINLEVEL: 0-NO PAIN

## 2024-07-03 NOTE — PROGRESS NOTES
Subjective   Patient ID:   Sandra Gutiérrez is a 54 y.o. female who presents for follow up.  Patient is here for BW.  Weak.  Patient has malaise.    HPI   This is a 53 years old British-speaking lady with medical history significant for mental retardation, moderate, hypertension, hyperlipidemia, gastroesophageal reflux disease, gastritis, colitis, status post colectomy, anemia, s/p hospitalization for colonic pseudoobstruction, bradycardia, complete heart block, s/p St Rainer dual chamber PPM implant 5/6/2022 to 5/13/2022.  Patient is presented for evaluation and treatment of the above complaints.  Blood pressure is stable, well controlled.  Has fatigue and malaise.  Patient has unsteady gait, arthralgia.  Completed  physical therapy.  Has fall episodes.     Has been biting nails, cannot control it.  Weak.     Still has occasional headaches.  Having small joint pain.  Taking medications as directed.  Has insomnia .    Review of Systems   Constitutional:  Positive for activity change, appetite change and fatigue.   Gastrointestinal:  Positive for constipation. Negative for abdominal distention.   Musculoskeletal:  Positive for back pain and gait problem.   Psychiatric/Behavioral:  Positive for sleep disturbance.        Objective   /62   Pulse 64   Temp 36.7 °C (98 °F) (Temporal)   Resp 16   Wt 46.3 kg (102 lb)   BMI 19.27 kg/m²     Physical Exam  Constitutional:       Appearance: Normal appearance.   HENT:      Head: Normocephalic.   Eyes:      Pupils: Pupils are equal, round, and reactive to light.   Cardiovascular:      Rate and Rhythm: Normal rate and regular rhythm.      Heart sounds: Normal heart sounds.   Pulmonary:      Breath sounds: Normal breath sounds.   Abdominal:      Palpations: Abdomen is soft.   Musculoskeletal:      Cervical back: Normal range of motion.   Skin:     General: Skin is warm.   Neurological:      Mental Status: She is alert. Mental status is at baseline.   Psychiatric:          Mood and Affect: Mood normal.         Assessment/Plan   Problem List Items Addressed This Visit    None  Visit Diagnoses         Codes    Screening due    -  Primary Z13.9    Relevant Orders    Hepatitis C antibody (Completed)    Annual physical exam     Z00.00    Relevant Orders    Referral to Gynecology    Vitamin B12 deficiency     E53.8    Relevant Orders    Vitamin B12    Vitamin D deficiency     E55.9    Relevant Orders    Vitamin D 25-Hydroxy,Total (for eval of Vitamin D levels)    Hyperlipidemia, unspecified hyperlipidemia type     E78.5    Relevant Orders    Comprehensive Metabolic Panel    Lipid Panel    Hypothyroidism, unspecified type     E03.9    Malaise and fatigue     R53.81, R53.83    Relevant Orders    Thyroid Stimulating Hormone    Hyperglycemia, unspecified     R73.9    Relevant Orders    Hemoglobin A1C    Iron deficiency anemia, unspecified iron deficiency anemia type     D50.9    Relevant Orders    CBC    Ferritin (Completed)    Iron and TIBC (Completed)    Mixed hyperlipidemia     E78.2    Relevant Medications    atorvastatin (Lipitor) 40 mg tablet          Ataxic, unsteady gait.  Follow by the neurology.  Start on physical therapy.     - Hypertension.  Well controlled.  Continue to take amlodipine to 10 mg daily.  Avoid salty foods.  Exercise as much as you can.     - Hyperlipidemia.  Continue to take Lipitor 20 mg once a day.  Keep Mediterranean diet.  Repeat lipid panel today.  Casandra 170, LDL 62.     - Depression, anxiety.  Current therapy with Zoloft.  Depression screening as above.  Follow-up with psychiatry.     - Mental retardation.  Moderate.  Supportive measures.     - H of hospitalization for complete heart block, colonic pseudoobstruction, s/p St Rainer Dual pacemaker placement 5/6/2022 5/13/2022.  Patient is stable.  Medications as directed.  Follow-up with Dr. Vargas cardiology.     - Colonic pseudoobstruction.  Chronic constipations.  Taking docusate and MiraLAX with relief.     -  Gastroesophageal reflux disease.  Continue to take Nexium.   Eat small portions.  Avoid Caffeine, spicy foods, chocolate, alcohol.  Do not wear tight clothes.  Keep last meal before bed time > 3 hours.  Keep head side of the bed elevated at all time.     - Overactive Bladder.  Follow up with urology.  Urinalysis.     - Anemia.  Follow up with GI.   Repeat BW today.     - Health maintenance.  Medicare wellness annual exam is up to date.  Return for vaccinations.  GYN exam.  Mammography.     - Normal body weight with BMI 19.27  Keep ideal BMI is less than 25.  Diet, exercise.      Sandra Zaidi MD   Patient was identified as a fall risk.   Risk prevention instructions provided.

## 2024-07-04 VITALS
WEIGHT: 102 LBS | SYSTOLIC BLOOD PRESSURE: 118 MMHG | HEART RATE: 64 BPM | TEMPERATURE: 98 F | DIASTOLIC BLOOD PRESSURE: 62 MMHG | BODY MASS INDEX: 19.27 KG/M2 | RESPIRATION RATE: 16 BRPM

## 2024-07-04 RX ORDER — ATORVASTATIN CALCIUM 40 MG/1
40 TABLET, FILM COATED ORAL DAILY
Qty: 90 TABLET | Refills: 3 | Status: SHIPPED | OUTPATIENT
Start: 2024-07-04 | End: 2024-10-02

## 2024-07-04 ASSESSMENT — ENCOUNTER SYMPTOMS
SLEEP DISTURBANCE: 1
CONSTIPATION: 1
ACTIVITY CHANGE: 1
APPETITE CHANGE: 1
ABDOMINAL DISTENTION: 0
BACK PAIN: 1
FATIGUE: 1

## 2024-08-05 ENCOUNTER — HOSPITAL ENCOUNTER (OUTPATIENT)
Dept: CARDIOLOGY | Facility: CLINIC | Age: 54
Discharge: HOME | End: 2024-08-05
Payer: MEDICARE

## 2024-08-05 DIAGNOSIS — Z95.0 PRESENCE OF CARDIAC PACEMAKER: ICD-10-CM

## 2024-08-05 DIAGNOSIS — I44.2 ATRIOVENTRICULAR BLOCK, COMPLETE (MULTI): ICD-10-CM

## 2024-08-05 PROCEDURE — 93296 REM INTERROG EVL PM/IDS: CPT

## 2024-08-05 PROCEDURE — 93294 REM INTERROG EVL PM/LDLS PM: CPT | Performed by: INTERNAL MEDICINE

## 2024-08-16 PROCEDURE — G0179 MD RECERTIFICATION HHA PT: HCPCS | Performed by: INTERNAL MEDICINE

## 2024-10-15 PROCEDURE — G0179 MD RECERTIFICATION HHA PT: HCPCS | Performed by: INTERNAL MEDICINE

## 2024-11-04 ENCOUNTER — HOSPITAL ENCOUNTER (OUTPATIENT)
Dept: CARDIOLOGY | Facility: CLINIC | Age: 54
Discharge: HOME | End: 2024-11-04
Payer: MEDICARE

## 2024-11-04 DIAGNOSIS — I44.2 ATRIOVENTRICULAR BLOCK, COMPLETE (MULTI): ICD-10-CM

## 2024-11-04 DIAGNOSIS — Z95.0 PRESENCE OF CARDIAC PACEMAKER: ICD-10-CM

## 2024-11-04 PROCEDURE — 93294 REM INTERROG EVL PM/LDLS PM: CPT | Performed by: INTERNAL MEDICINE

## 2024-11-04 PROCEDURE — 93296 REM INTERROG EVL PM/IDS: CPT

## 2024-11-12 ENCOUNTER — APPOINTMENT (OUTPATIENT)
Dept: PRIMARY CARE | Facility: CLINIC | Age: 54
End: 2024-11-12
Payer: MEDICARE

## 2024-11-12 VITALS
RESPIRATION RATE: 16 BRPM | WEIGHT: 126 LBS | SYSTOLIC BLOOD PRESSURE: 122 MMHG | BODY MASS INDEX: 23.81 KG/M2 | DIASTOLIC BLOOD PRESSURE: 78 MMHG | TEMPERATURE: 98.2 F | HEART RATE: 62 BPM

## 2024-11-12 DIAGNOSIS — E03.9 HYPOTHYROIDISM, UNSPECIFIED TYPE: ICD-10-CM

## 2024-11-12 DIAGNOSIS — R53.81 MALAISE AND FATIGUE: ICD-10-CM

## 2024-11-12 DIAGNOSIS — Z00.00 ANNUAL PHYSICAL EXAM: ICD-10-CM

## 2024-11-12 DIAGNOSIS — R73.9 HYPERGLYCEMIA, UNSPECIFIED: ICD-10-CM

## 2024-11-12 DIAGNOSIS — R53.83 MALAISE AND FATIGUE: ICD-10-CM

## 2024-11-12 DIAGNOSIS — Z12.31 ENCOUNTER FOR SCREENING MAMMOGRAM FOR MALIGNANT NEOPLASM OF BREAST: ICD-10-CM

## 2024-11-12 DIAGNOSIS — R26.81 UNSTEADY GAIT: ICD-10-CM

## 2024-11-12 DIAGNOSIS — E78.5 HYPERLIPIDEMIA, UNSPECIFIED HYPERLIPIDEMIA TYPE: ICD-10-CM

## 2024-11-12 DIAGNOSIS — R26.81 UNSTEADINESS ON FEET: Primary | ICD-10-CM

## 2024-11-12 DIAGNOSIS — G31.9 DEGENERATIVE DISEASE OF NERVOUS SYSTEM, UNSPECIFIED: ICD-10-CM

## 2024-11-12 DIAGNOSIS — E53.8 VITAMIN B12 DEFICIENCY: ICD-10-CM

## 2024-11-12 DIAGNOSIS — D50.9 IRON DEFICIENCY ANEMIA, UNSPECIFIED IRON DEFICIENCY ANEMIA TYPE: ICD-10-CM

## 2024-11-12 DIAGNOSIS — D50.8 OTHER IRON DEFICIENCY ANEMIA: Primary | ICD-10-CM

## 2024-11-12 DIAGNOSIS — E55.9 VITAMIN D DEFICIENCY: ICD-10-CM

## 2024-11-12 LAB
25(OH)D3 SERPL-MCNC: 55 NG/ML (ref 30–100)
ALBUMIN SERPL BCP-MCNC: 4 G/DL (ref 3.4–5)
ALP SERPL-CCNC: 118 U/L (ref 45–117)
ALT SERPL W P-5'-P-CCNC: 27 U/L (ref 16–63)
ANION GAP SERPL CALC-SCNC: 15 MMOL/L (ref 10–20)
AST SERPL W P-5'-P-CCNC: 18 U/L (ref 15–37)
BASOPHILS # BLD AUTO: 0.01 X10*3/UL (ref 0.1–1.6)
BASOPHILS NFR BLD AUTO: 0.27 % (ref 0–0.3)
BILIRUB SERPL-MCNC: 0.4 MG/DL (ref 0.2–1)
BUN SERPL-MCNC: 12 MG/DL (ref 7–18)
CALCIUM SERPL-MCNC: 9.1 MG/DL (ref 8.5–10.1)
CHLORIDE SERPL-SCNC: 101 MMOL/L (ref 98–107)
CHOLEST SERPL-MCNC: 171 MG/DL (ref 0–199)
CHOLESTEROL/HDL RATIO: 1.8 (ref 4.2–7)
CO2 SERPL-SCNC: 27 MMOL/L (ref 21–32)
CREAT SERPL-MCNC: 0.54 MG/DL (ref 0.6–1.1)
EGFRCR SERPLBLD CKD-EPI 2021: >90 ML/MIN/1.73M*2
EOSINOPHIL # BLD AUTO: 0.04 X10*3/UL (ref 0.04–0.5)
EOSINOPHIL NFR BLD AUTO: 0.91 % (ref 0.7–7)
ERYTHROCYTE [DISTWIDTH] IN BLOOD BY AUTOMATED COUNT: 18.4 % (ref 11.5–14.5)
FERRITIN SERPL-MCNC: 18 NG/ML (ref 8–150)
GLUCOSE SERPL-MCNC: 98 MG/DL (ref 74–100)
HBA1C MFR BLD: 5.9 %
HCT VFR BLD AUTO: 31.1 % (ref 36.6–46.6)
HDLC SERPL-MCNC: 97 MG/DL (ref 40–59)
HGB BLD-MCNC: 9.99 G/DL (ref 12–15.4)
IRON SATN MFR SERPL: 7 % (ref 25–45)
IRON SERPL-MCNC: 34 UG/DL (ref 35–150)
IS PATIENT FASTING: YES
LDLC SERPL DIRECT ASSAY-MCNC: 62 MG/DL (ref 0–100)
LYMPHOCYTES # BLD AUTO: 0.97 X10*3/UL (ref 0–6)
LYMPHOCYTES NFR BLD AUTO: 21.29 % (ref 20.5–51.1)
MCH RBC QN AUTO: 24.3 PG (ref 26–32)
MCHC RBC AUTO-ENTMCNC: 32.1 G/DL (ref 31–38)
MCV RBC AUTO: 75.6 FL (ref 80–96)
MONOCYTES # BLD AUTO: 0.28 X10*3/UL (ref 1.6–24.9)
MONOCYTES NFR BLD AUTO: 6.11 % (ref 1.7–9.3)
NEUTROPHILS # BLD AUTO: 3.24 X10*3/UL (ref 1.4–6.5)
NEUTROPHILS NFR BLD AUTO: 71.42 % (ref 42.2–75.2)
PLATELET # BLD AUTO: 189.7 X10*3/UL (ref 150–450)
PMV BLD AUTO: 9.14 FL (ref 7.8–11)
POTASSIUM SERPL-SCNC: 4.4 MMOL/L (ref 3.5–5.1)
PROT SERPL-MCNC: 7.5 G/DL (ref 6.4–8.2)
RBC # BLD AUTO: 4.11 X10*6/UL (ref 3.9–5.3)
SODIUM SERPL-SCNC: 139 MMOL/L (ref 136–145)
TIBC SERPL-MCNC: 459 UG/DL (ref 240–445)
TRIGL SERPL-MCNC: 43 MG/DL
TSH SERPL-ACNC: 2.55 MIU/L (ref 0.44–3.98)
UIBC SERPL-MCNC: 425 UG/DL (ref 110–370)
VIT B12 SERPL-MCNC: 557 PG/ML (ref 193–986)
WBC # BLD AUTO: 4.54 X10*3/UL (ref 4.5–10.5)

## 2024-11-12 PROCEDURE — 82306 VITAMIN D 25 HYDROXY: CPT | Performed by: INTERNAL MEDICINE

## 2024-11-12 PROCEDURE — 85025 COMPLETE CBC W/AUTO DIFF WBC: CPT | Performed by: INTERNAL MEDICINE

## 2024-11-12 PROCEDURE — 80061 LIPID PANEL: CPT | Performed by: INTERNAL MEDICINE

## 2024-11-12 PROCEDURE — 1036F TOBACCO NON-USER: CPT | Performed by: INTERNAL MEDICINE

## 2024-11-12 PROCEDURE — 83036 HEMOGLOBIN GLYCOSYLATED A1C: CPT | Performed by: INTERNAL MEDICINE

## 2024-11-12 PROCEDURE — 82607 VITAMIN B-12: CPT | Performed by: INTERNAL MEDICINE

## 2024-11-12 PROCEDURE — 80053 COMPREHEN METABOLIC PANEL: CPT | Performed by: INTERNAL MEDICINE

## 2024-11-12 PROCEDURE — 83550 IRON BINDING TEST: CPT

## 2024-11-12 PROCEDURE — 84443 ASSAY THYROID STIM HORMONE: CPT | Performed by: INTERNAL MEDICINE

## 2024-11-12 PROCEDURE — 99213 OFFICE O/P EST LOW 20 MIN: CPT | Performed by: INTERNAL MEDICINE

## 2024-11-12 PROCEDURE — 82728 ASSAY OF FERRITIN: CPT

## 2024-11-12 PROCEDURE — 83540 ASSAY OF IRON: CPT

## 2024-11-12 PROCEDURE — G2211 COMPLEX E/M VISIT ADD ON: HCPCS | Performed by: INTERNAL MEDICINE

## 2024-11-12 RX ORDER — FERROUS SULFATE 325(65) MG
325 TABLET ORAL
Qty: 30 TABLET | Refills: 3 | Status: SHIPPED | OUTPATIENT
Start: 2024-11-12 | End: 2025-11-12

## 2024-11-12 ASSESSMENT — ENCOUNTER SYMPTOMS
FATIGUE: 1
LIGHT-HEADEDNESS: 1
DEPRESSION: 0
OCCASIONAL FEELINGS OF UNSTEADINESS: 1
NERVOUS/ANXIOUS: 1
ARTHRALGIAS: 1
LOSS OF SENSATION IN FEET: 0
SLEEP DISTURBANCE: 1
ACTIVITY CHANGE: 1

## 2024-11-12 ASSESSMENT — PAIN SCALES - GENERAL: PAINLEVEL_OUTOF10: 0-NO PAIN

## 2024-11-12 NOTE — PROGRESS NOTES
Subjective   Patient ID: Sandra Gutiérrez is a 54 y.o. female who presents for  follow up.  Patient is here for BW.  Has unsteady gait, interested to start on PT.    HPI     This is a 54 years old [place]-speaking lady with medical history significant for mental retardation, moderate, hypertension, hyperlipidemia, gastroesophageal reflux disease, gastritis, colitis, status post colectomy, anemia, s/p hospitalization for colonic pseudoobstruction, bradycardia, complete heart block, s/p St Rainer dual chamber PPM implant 5/6/2022 to 5/13/2022.  Patient is presented for evaluation and treatment of the above complaints.  Blood pressure is stable, well controlled.  Has fatigue and malaise.  Patient has unsteady gait, arthralgia, fell few times.  Interested to start on physical therapy.  Has fall episodes.     Has been biting nails, cannot control it.  Weak.     Still has occasional headaches.  Having small joint pain.  Taking medications as directed.  Has insomnia .    Review of Systems   Constitutional:  Positive for activity change and fatigue.   Musculoskeletal:  Positive for arthralgias and gait problem.   Neurological:  Positive for light-headedness.   Psychiatric/Behavioral:  Positive for sleep disturbance. The patient is nervous/anxious.        Objective   /78   Pulse 62   Temp 36.8 °C (98.2 °F) (Temporal)   Resp 16   Wt 57.2 kg (126 lb)   BMI 23.81 kg/m²     Physical Exam  HENT:      Head: Normocephalic.   Cardiovascular:      Rate and Rhythm: Normal rate and regular rhythm.      Heart sounds: Normal heart sounds.   Pulmonary:      Breath sounds: Normal breath sounds.   Abdominal:      Palpations: Abdomen is soft.   Musculoskeletal:         General: Tenderness present.   Skin:     General: Skin is warm.   Neurological:      Mental Status: She is alert. Mental status is at baseline.   Psychiatric:         Mood and Affect: Mood normal.         Assessment/Plan   Problem List Items Addressed This Visit              ICD-10-CM    Unsteadiness on feet - Primary R26.81    Relevant Orders    Referral to Physical Therapy    Degenerative disease of nervous system, unspecified G31.9     Other Visit Diagnoses         Codes    Unsteady gait     R26.81    Relevant Orders    Referral to Physical Therapy    Annual physical exam     Z00.00    Relevant Orders    Referral to Gynecology    Encounter for screening mammogram for malignant neoplasm of breast     Z12.31    Relevant Orders    BI mammo bilateral screening tomosynthesis    Vitamin B12 deficiency     E53.8    Relevant Orders    Vitamin B12    Vitamin D deficiency     E55.9    Relevant Orders    Vitamin D 25-Hydroxy,Total (for eval of Vitamin D levels)    Hyperlipidemia, unspecified hyperlipidemia type     E78.5    Relevant Orders    Comprehensive Metabolic Panel    Lipid Panel    Malaise and fatigue     R53.81, R53.83    Hyperglycemia, unspecified     R73.9    Relevant Orders    Hemoglobin A1C    Hypothyroidism, unspecified type     E03.9    Relevant Orders    Thyroid Stimulating Hormone    Iron deficiency anemia, unspecified iron deficiency anemia type     D50.9    Relevant Orders    CBC w/5 Part Differential, [facility]    Ferritin    Iron and TIBC             Ataxic, unsteady gait.  Follow by the neurology.  Start on physical therapy.     - Hypertension.  Well controlled.  Continue to take amlodipine to 10 mg daily.  Avoid salty foods.  Exercise as much as you can.     - Hyperlipidemia.  Continue to take Lipitor 20 mg once a day.  Keep Mediterranean diet.  Repeat lipid panel today.  Casandra 170, LDL 62.     - Depression, anxiety.  Current therapy with Zoloft.  Depression screening as above.  Follow-up with psychiatry.     - Mental retardation.  Moderate.  Supportive measures.     - H of hospitalization for complete heart block, colonic pseudoobstruction, s/p St Rainer Dual pacemaker placement 5/6/2022 5/13/2022.  Patient is stable.  Medications as directed.  Follow-up with   [facility] cardiology.     - Colonic pseudoobstruction.  Chronic constipations.  Taking docusate and MiraLAX with relief.     - Gastroesophageal reflux disease.  Continue to take Nexium.   Eat small portions.  Avoid Caffeine, spicy foods, chocolate, alcohol.  Do not wear tight clothes.  Keep last meal before bed time > 3 hours.  Keep head side of the bed elevated at all time.     - Overactive Bladder.  Follow up with urology.  Urinalysis.     - Anemia.  Follow up with GI.   Repeat BW today.     - Health maintenance.  Medicare wellness annual exam is up to date.  Return for vaccinations.  GYN exam.  Mammography.     - Normal body weight with BMI  23.81.  Keep ideal BMI is less than 25.  Diet, exercise.

## 2024-12-11 ENCOUNTER — HOSPITAL ENCOUNTER (OUTPATIENT)
Dept: RADIOLOGY | Facility: CLINIC | Age: 54
Discharge: HOME | End: 2024-12-11
Payer: MEDICARE

## 2024-12-11 VITALS — HEIGHT: 61 IN | BODY MASS INDEX: 23.79 KG/M2 | WEIGHT: 126 LBS

## 2024-12-11 DIAGNOSIS — Z12.31 ENCOUNTER FOR SCREENING MAMMOGRAM FOR MALIGNANT NEOPLASM OF BREAST: ICD-10-CM

## 2024-12-11 PROCEDURE — 77067 SCR MAMMO BI INCL CAD: CPT

## 2024-12-13 ENCOUNTER — EVALUATION (OUTPATIENT)
Dept: PHYSICAL THERAPY | Facility: CLINIC | Age: 54
End: 2024-12-13
Payer: MEDICARE

## 2024-12-13 VITALS — HEART RATE: 83 BPM | SYSTOLIC BLOOD PRESSURE: 161 MMHG | DIASTOLIC BLOOD PRESSURE: 93 MMHG

## 2024-12-13 DIAGNOSIS — R26.81 UNSTEADINESS ON FEET: ICD-10-CM

## 2024-12-13 DIAGNOSIS — R26.81 UNSTEADY GAIT: ICD-10-CM

## 2024-12-13 PROCEDURE — 97110 THERAPEUTIC EXERCISES: CPT | Mod: GP | Performed by: PHYSICAL THERAPIST

## 2024-12-13 PROCEDURE — 97161 PT EVAL LOW COMPLEX 20 MIN: CPT | Mod: GP | Performed by: PHYSICAL THERAPIST

## 2024-12-13 ASSESSMENT — BALANCE ASSESSMENTS
SITTING WITH BACK UNSUPPORTED BUT FEET SUPPORTED ON FLOOR OR ON A STOOL: ABLE TO SIT SAFELY AND SECURELY FOR 2 MINUTES
TURN 360 DEGREES: ABLE TO TURN 360 DEGREES SAFELY BUT SLOWLY
LONG VERSION TOTAL SCORE (MAX 56): 31
STANDING UNSUPPORTED: ABLE TO STAND SAFELY FOR 2 MINUTES
PLACE ALTERNATE FOOT ON STEP OR STOOL WHILE STANDING UNSUPPORTED: TURNS SIDEWAYS ONLY BUT MAINTAINS BALANCE
PICK UP OBJECT FROM THE FLOOR FROM A STANDING POSITION: UNABLE TO TRY/NEEDS ASSIST TO KEEP FROM LOSING BALANCE OR FALLING
STANDING UNSUPPORTED WITH EYES CLOSED: ABLE TO STAND 10 SECONDS SAFELY
TRANSFERS: ABLE TO TRANSFER WITH VERBAL CUEING AND/OR SUPERVISION
STANDING TO SITTING: CONTROLS DESCENT BY USING HANDS
PLACE ALTERNATE FOOT ON STEP OR STOOL WHILE STANDING UNSUPPORTED: ABLE TO COMPLETE GREATER THAN 2 STEPS NEEDS MINIMAL ASSIST
STANDING TO SITTING: ABLE TO STAND INDEPENDENTLY USING HANDS
STANDING UNSUPPORTED WITH FEET TOGETHER: ABLE TO PLACE FEET TOGETHER INDEPENDENTLY AND STAND 1 MINUTE WITH SUPERVISION
REACHING FORWARD WITH OUTSTRETCHED ARM WHILE STANDING: REACHES FORWARD BUT NEEDS SUPERVISION
STANDING UNSUPPORTED ONE FOOT IN FRONT: NEEDS HELP TO STEP BUT CAN HOLD 15 SECONDS
STANDING ON ONE LEG: TRIES TO LIFT LEG UNABLE TO HOLD 3 SECONDS BUT REMAINS STANDING INDEPENDENTLY

## 2024-12-13 ASSESSMENT — PAIN - FUNCTIONAL ASSESSMENT: PAIN_FUNCTIONAL_ASSESSMENT: 0-10

## 2024-12-13 ASSESSMENT — ENCOUNTER SYMPTOMS
LOSS OF SENSATION IN FEET: 0
DEPRESSION: 0
OCCASIONAL FEELINGS OF UNSTEADINESS: 1

## 2024-12-13 ASSESSMENT — PAIN SCALES - GENERAL: PAINLEVEL_OUTOF10: 0 - NO PAIN

## 2024-12-13 NOTE — PROGRESS NOTES
Physical Therapy    Physical Therapy Evaluation and Treatment      Patient Name: Sandra Gutiérrez  MRN: [id number]  Today's Date: 12/15/2024  [employer] Medicare-no auth  Visit number: 1   Time Entry:   Time Calculation  Start Time: 0935  Stop Time: 1030  Time Calculation (min): 55 min  PT Evaluation Time Entry  PT Evaluation (Low) Time Entry: 31  PT Therapeutic Procedures Time Entry  Therapeutic Exercise Time Entry: 23       Assessment:  Patient is a 54 year old female referred to [facility]'s outpatient physical therapy services known to this facility with a chief complaint of imbalance. Pt. Has a medical history including cognitive delay, cardiac pacemaker and chronic weakness and imbalance that are developmental in nature.  The patient was last discharged from physical therapy under my care on 5/16/24 and functional outcome measures today were compared to prior scores.  The patient demonstrated slightly reduced gait speed during the TUG today. In addition pt. Demonstrated impaired balance on the WAGNER today compared to last visit scoring a 31/56 today compared to 37/56 at discharge on 5/16/24.  Ms. Gutiérrez will benefit from brief physical therapy plan of care to address the above deficits, return to PLOF and reduce fall risk in her home and the community.         Plan: 6MWT, review HEP  OP PT Plan  Treatment/Interventions: Gait training, Neuromuscular re-education, Education/ Instruction, Manual therapy, Self care/ home management, Therapeutic activities, Therapeutic exercises  PT Plan: Skilled PT  PT Frequency: 1 time per week  Duration: 8 visits  Onset Date: 11/12/24  Certification Period Start Date: 12/13/24  Certification Period End Date: 03/13/25  Rehab Potential: Good    Current Problem:   1. Unsteadiness on feet  Referral to Physical Therapy    Follow Up In Physical Therapy      2. Unsteady gait  Referral to Physical Therapy    Follow Up In Physical Therapy          Subjective      Pt. Is using her walker  "all the time to get around.  Sometimes when Kevin mom comes she will bring the cane to her house. They go to Dewayne's mom's house every other weekend.  She is taking medication for her bladder and that has helped.  She takes blood pressure once.  Pt.  \"Sandra\" present during full visit providing minimal assist with history and CLOF.     CLOF: Pt. Lives with family member \"Dewayne\".  Dewayne's mother, helps to shop and prepare food, has a home health aide comes every day about 4 hours a day from \"[employer]\".  The aide cooks food  and helps with cleaning but doesn't help her with bathing.  Gets a ride from medical transportation.  Her and dewayne help each other with bathing, she dresses on her own      Home Setup: lives with roommate dewayne, lives in an apartment with an elevator to get in, bathtub with shower chair and a grab bar.  Dewayne's mom has stairs to get inside, about 3 steps and there is a basement and 2nd floor and there is just one railing  Equipment: quad cane, has as rollator walker but she does not use it.   Hobbies: reading, does puzzles, watches netflix and movies  Physical Activity: she is doing her exercises from her prior therapy here eery other day  Sleep: sleeps well  Hydration: 2 big bottles of water a day  Falls: per  she has had a fall at home, a \"few\".  She denies hurting herself,   Pt. Goal:  \"strength and muscles\"    : Sandra BOURNE= [phone]      Precautions: pacemaker, complete heart block, PVD     Vital Signs: KF=371/93 sitting electronic cuff, HR=83 BPM     Pain: 0/10          Objective     Cognition: hx. Cognitive delay  observation= pt. Arrived to visit with SBQC  Coordination=finger opposition mild dec. Speed on R and moderate dec. Coordination on L, mild dec. Speed in pronation/supination, mod dec. Speed DF/PF bilaterally   Vision=wears glasses for distance, doesn't use them for reading  Hearing=WNL  Sensation=WNL  Gait= narrow HARVEY, trunk held in " excessive extension, decreased core control, decreased bilateral hip flexion bilaterally, decreased terminal hip extension bilaterally, tendency to veer to the left or right during ambulation and turns with no device requiring CGA->min-A  Transfers=BUE support at SBA, VC to reach back to armrests to sit down    Lower Extremity Strength MMT (tested in seated)    Left Right   Hip flexion 4-/5 4/5   Hip ADD  4/5 4/5   Hip ABD 3+/5 3+/5   Knee flexion 4+/5 4+/5   Knee Extension 4-/5 4/5   Ankle Dorsiflexion 3+5 4-/5   Ankle Plantarflexion 3+/5 4/5   Ankle Inversion 4/5 4/5   Ankle Eversion 4-/5 4/5      TU24= 17 seconds with CGA with no device  24= 17 seconds with SBQC with CGA  24=15.7 seconds with rollator, 12.87 seconds with no device with CGA, 9.9 sec with rollator with supervision assist  IE=16 seconds with no device with CGA->min-A  IE=17 seconds with SBQC with SBA      10MWT:  24=12 seconds with rollator walker  24=13.6 seconds with no device with CGA  24=12 seconds with no device with CGA, (.83)  IE=16.69 seconds with SBQC with SBA  (.59 ms/)  IE=12.13 seconds with rollator walker at supervision level      30sec.sit<>stand test:   24=10 reps with no UE support, utilizing legs against back of chair  24=11 reps with no UE support, utilizing legs against back of chair  IE=9 reps with no UE support, utilizing back of legs against chair     5x sit<>stand test:   24=14 sec. With no UE support, utilizing legs against back of chair  24=14 sec. With no UE support, utilizing legs against chair  IE=16 sec. with no UE support, utilizing back of legs against chair    WAGNER24=31/56  24=37/56  IE=30/ (see flowsheet)    Treatments:    There Ex:  -Provided pt. With printed handout for stationary pedal bike   -sit<>stands x5 with no UE support  -provided pt. For handout for sit<>stands with no UE support    EDUCATION:  -see flowsheet/ treatment    "    Goals:  Active       PT Problem       PT Goal 1       Start:  12/15/24    Expected End:  03/13/25       Pt. Will improve WAGNER to a 37/56 to meet prior level of function as of 5/16/24.         PT Goal 2       Start:  12/15/24    Expected End:  03/13/25       Pt. Will improve TUG to <13.5 seconds without a device to meet cut off score for falls compared to normative values by end of POC.         PT Goal 3       Start:  12/15/24    Expected End:  03/13/25       Pt. Will complete 11 repetitions on 30 second sit to stand to meet PLOF score as of 5/16/24.         PT Goal 4       Start:  12/15/24    Expected End:  03/13/25       Pt. Will be independent in HEP in next 1-2 visits to promote self efficacy, manage symptoms and meet other LTG.          Patient Stated Goal 1       Start:  12/15/24    Expected End:  03/13/25       \"Strength and muscles\"                   "

## 2024-12-14 PROCEDURE — G0179 MD RECERTIFICATION HHA PT: HCPCS | Performed by: INTERNAL MEDICINE

## 2024-12-16 ENCOUNTER — HOSPITAL ENCOUNTER (OUTPATIENT)
Dept: RADIOLOGY | Facility: EXTERNAL LOCATION | Age: 54
Discharge: HOME | End: 2024-12-16
Payer: MEDICARE

## 2025-01-03 ENCOUNTER — HOSPITAL ENCOUNTER (OUTPATIENT)
Dept: CARDIOLOGY | Facility: CLINIC | Age: 55
Discharge: HOME | End: 2025-01-03
Payer: MEDICARE

## 2025-01-03 DIAGNOSIS — Z95.0 PRESENCE OF CARDIAC PACEMAKER: ICD-10-CM

## 2025-01-03 DIAGNOSIS — I44.2 ATRIOVENTRICULAR BLOCK, COMPLETE (MULTI): ICD-10-CM

## 2025-01-08 ENCOUNTER — APPOINTMENT (OUTPATIENT)
Dept: PHYSICAL THERAPY | Facility: CLINIC | Age: 55
End: 2025-01-08
Payer: MEDICARE

## 2025-01-08 NOTE — PROGRESS NOTES
Physical Therapy    Physical Therapy Treatment    Patient Name: Sandra Gutiérrez  MRN: [id number]  Today's Date: 1/8/2025  [employer] Medicare-no auth  Visit number: 2   Time Entry:                             Assessment:     Plan:       Current Problem  No diagnosis found.    General          Subjective    Precautions     Vital Signs     Pain       Objective   Cognition     Coordination:     Activity Tolerance:     Outcome Measures:  {PT Outcome Measures:00887}  Treatments:  {PT Treatments:38146}    OP EDUCATION:       Goals:

## 2025-01-13 ENCOUNTER — APPOINTMENT (OUTPATIENT)
Dept: PHYSICAL THERAPY | Facility: CLINIC | Age: 55
End: 2025-01-13
Payer: MEDICARE

## 2025-01-20 ENCOUNTER — APPOINTMENT (OUTPATIENT)
Dept: PHYSICAL THERAPY | Facility: CLINIC | Age: 55
End: 2025-01-20
Payer: MEDICARE

## 2025-01-27 ENCOUNTER — APPOINTMENT (OUTPATIENT)
Dept: PHYSICAL THERAPY | Facility: CLINIC | Age: 55
End: 2025-01-27
Payer: MEDICARE

## 2025-02-03 ENCOUNTER — APPOINTMENT (OUTPATIENT)
Dept: PHYSICAL THERAPY | Facility: CLINIC | Age: 55
End: 2025-02-03
Payer: MEDICARE

## 2025-02-04 ENCOUNTER — APPOINTMENT (OUTPATIENT)
Dept: PRIMARY CARE | Facility: CLINIC | Age: 55
End: 2025-02-04
Payer: MEDICARE

## 2025-02-05 ENCOUNTER — APPOINTMENT (OUTPATIENT)
Dept: GASTROENTEROLOGY | Facility: CLINIC | Age: 55
End: 2025-02-05
Payer: MEDICARE

## 2025-02-10 ENCOUNTER — APPOINTMENT (OUTPATIENT)
Dept: PHYSICAL THERAPY | Facility: CLINIC | Age: 55
End: 2025-02-10
Payer: MEDICARE

## 2025-03-18 ENCOUNTER — APPOINTMENT (OUTPATIENT)
Dept: PRIMARY CARE | Facility: CLINIC | Age: 55
End: 2025-03-18
Payer: MEDICARE

## 2025-05-14 ENCOUNTER — OFFICE VISIT (OUTPATIENT)
Dept: CARDIOLOGY | Facility: CLINIC | Age: 55
End: 2025-05-14
Payer: MEDICARE

## 2025-05-14 VITALS
DIASTOLIC BLOOD PRESSURE: 79 MMHG | WEIGHT: 98.19 LBS | HEART RATE: 68 BPM | BODY MASS INDEX: 18.55 KG/M2 | OXYGEN SATURATION: 96 % | SYSTOLIC BLOOD PRESSURE: 132 MMHG

## 2025-05-14 DIAGNOSIS — I44.2 COMPLETE HEART BLOCK: Primary | ICD-10-CM

## 2025-05-14 PROCEDURE — 93005 ELECTROCARDIOGRAM TRACING: CPT | Performed by: NURSE PRACTITIONER

## 2025-05-14 PROCEDURE — 99214 OFFICE O/P EST MOD 30 MIN: CPT | Performed by: NURSE PRACTITIONER

## 2025-05-14 PROCEDURE — 1036F TOBACCO NON-USER: CPT | Performed by: NURSE PRACTITIONER

## 2025-05-14 PROCEDURE — 99212 OFFICE O/P EST SF 10 MIN: CPT | Performed by: NURSE PRACTITIONER

## 2025-05-14 RX ORDER — ACETAMINOPHEN 325 MG/1
TABLET ORAL EVERY 6 HOURS PRN
COMMUNITY

## 2025-05-14 RX ORDER — ONDANSETRON 4 MG/1
4 TABLET, FILM COATED ORAL EVERY 8 HOURS PRN
COMMUNITY

## 2025-05-14 ASSESSMENT — PATIENT HEALTH QUESTIONNAIRE - PHQ9
SUM OF ALL RESPONSES TO PHQ9 QUESTIONS 1 AND 2: 0
2. FEELING DOWN, DEPRESSED OR HOPELESS: NOT AT ALL
1. LITTLE INTEREST OR PLEASURE IN DOING THINGS: NOT AT ALL

## 2025-05-14 ASSESSMENT — COLUMBIA-SUICIDE SEVERITY RATING SCALE - C-SSRS
2. HAVE YOU ACTUALLY HAD ANY THOUGHTS OF KILLING YOURSELF?: NO
1. IN THE PAST MONTH, HAVE YOU WISHED YOU WERE DEAD OR WISHED YOU COULD GO TO SLEEP AND NOT WAKE UP?: NO
6. HAVE YOU EVER DONE ANYTHING, STARTED TO DO ANYTHING, OR PREPARED TO DO ANYTHING TO END YOUR LIFE?: NO

## 2025-05-14 ASSESSMENT — PAIN SCALES - GENERAL: PAINLEVEL_OUTOF10: 0-NO PAIN

## 2025-05-14 NOTE — PROGRESS NOTES
Chief Complaint:   Annual follow up     History Of Present Illness:    Sandra Gutiérrez is a 55 y.o. female    1. HTN  2. 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 Heart Block - this was detected in January of 2022 on a routine visit. She has no change in her symptoms. Her amlodipine was discontinued and atenolol started some time ago.      MARCH 2022: Her ECG showed complete heart block with junctional escape rhythm at 50 bpm.   MAY 2022: [place] [facility] [place] While she was admitted, EP was consulted for symptomatic high grade AVB and device was implanted.   MAY 12, 2022: Dual chamber PPM implanted by Darshanchristian Erickson. Model: [employer] 2272 Assurity MRI Serial Number: [id number].     TESTING:       -MONITOR: [employer] 14 days (FEB-MAR 2022) Baseline 2nd degree AVB, Type 2 and 3rd degree AVB with junctional escape rhythm with avg HR 50.1 bpm. Bradycardia 89%. 63932 VEs ([place] 3%). No AF/AFL noted.       -ECHO: TTE (MAR 2022) LVEF 60-65%. No significant valve disease.    -Cardiac MRI: (MAR 2022) LVEF 58%. Negative for myocardial inflammation / edema / myocardial fibrosis/ infiltration / or infarction. Aortic Root and ascending thoracic aorta mildly dilated 3.6 cm.       TODAY routine annual follow up. She is feeling well with no discernible cardiac complaints. Assisted living aide  is present with her today. She is following regularly with her PCP. She had extensive hospitalization January for her colonic distention, underwent surgery.   Device checks- no arrhythmias, device is functioning wnl.  ECG 5/7/25 a sensed, V paced. 65 bpm  ECG 4/26/24 Atrial sensed  84bpm      ROS:  Constitutional: not feeling poorly, no fever and no chills.   ENT: no nose bleeds  Cardiovascular: no chest pain, no tightness or heavy pressure, no shortness of breath, no palpitations, no lower extremity edema and as noted in HPI.   Respiratory: no cough, no shortness of  breath during exertion, no PND, no orthopnea.   Gastrointestinal: no nausea, no vomiting, no melena  Genitourinary: no hematuria.   Musculoskeletal: no difficulty walking.   Neurological: no dizziness and no fainting.   Endocrine: no thyroid issues       Last Recorded Vitals:  Vitals:    05/14/25 1057   BP: 132/79   BP Location: Left arm   Patient Position: Sitting   BP Cuff Size: Small adult   Pulse: 68   SpO2: 96%   Weight: (!) 44.5 kg (98 lb 3 oz)           Social History:  She reports that she has never smoked. She has never been exposed to tobacco smoke. She has never used smokeless tobacco. She reports that she does not drink alcohol and does not use drugs.    Family History:  No family history on file.     Allergies:  Patient has no known allergies.    Outpatient Medications:  Current Outpatient Medications   Medication Instructions    acetaminophen (Tylenol) 325 mg tablet Every 6 hours PRN    amLODIPine (NORVASC) 10 mg, oral, Daily    atenolol (Tenormin) 25 mg tablet 2 tablets, oral, Daily    atorvastatin (LIPITOR) 40 mg, oral, Daily    cholecalciferol (Vitamin D-3) 50 mcg (2,000 unit) capsule oral, Daily    ferrous sulfate 325 mg, oral, Daily with breakfast    omeprazole (PriLOSEC) 40 mg DR capsule TAKE 1 CAPSULE BY MOUTH EVERY DAY    ondansetron (ZOFRAN) 4 mg, Every 8 hours PRN    polyethylene glycol (GLYCOLAX, MIRALAX) 17 g, oral, Daily    potassium chloride CR (Klor-Con M20) 20 mEq ER tablet 40 mEq, oral, Daily    sertraline (ZOLOFT) 25 mg, Daily    vibegron 75 mg, oral, Daily       Physical Exam:  Constitutional: alert and in no acute distress.   Eyes: no erythema, swelling or discharge from the eye .   Neck: neck is supple, symmetric, trachea midline, no masses .   Pulmonary: no increased work of breathing or signs of respiratory distress  and lungs clear to auscultation.    Cardiovascular:  JVP was normal, no thrills , regular rhythm, normal S1 and S2, no murmurs , pedal pulses 2+ bilaterally  and no  edema .   Abdomen: abdomen non-tender, no masses .   Skin: skin warm and dry, normal skin turgor .   Psychiatric judgment and insight is normal  and oriented to person, place and time .                 Last Labs:  CBC -  Lab Results   Component Value Date    WBC 4.54 11/12/2024    HGB 9.99 (L) 11/12/2024    HCT 31.1 (L) 11/12/2024    MCV 75.6 (L) 11/12/2024    .7 11/12/2024       CMP -  Lab Results   Component Value Date    CALCIUM 9.1 11/12/2024    PROT 7.5 11/12/2024    ALBUMIN 4.0 11/12/2024    AST 18 11/12/2024    ALT 27 11/12/2024    ALKPHOS 118 (H) 11/12/2024    BILITOT 0.4 11/12/2024           RENAL FUNCTION PANEL -   Lab Results   Component Value Date    GLUCOSE 98 11/12/2024     11/12/2024    K 4.4 11/12/2024     11/12/2024    CO2 27 11/12/2024    ANIONGAP 15 11/12/2024    BUN 12 11/12/2024    CREATININE 0.54 (L) 11/12/2024    CALCIUM 9.1 11/12/2024    ALBUMIN 4.0 11/12/2024        Lab Results   Component Value Date     (H) 05/06/2022    HGBA1C 5.9 (H) 11/12/2024    HGBA1C 5.8 07/03/2024           Cardiac Imaging:  MR CARDIAC MORPHOLOGY AND FUNCTION W AND WO IV CONTRAST 05/10/2022  CMR Performed on 3 [employer] Scanner  1. Normal LV size (EDVi 59 ml/m2) and systolic function (LVEF 58%)   with no regional wall motion  abnormalities  2. No evidence of myocardial inflammation/edema on T2-weighted   imaging. No evidence of iron overload.  3. No evidence of myocardial fibrosis, infiltration or infarction   based on late gadolinium imaging.  4. The right hemidiaphragm is elevated with maximum hepatic dimension   of 15.9 cm. There is evidence of  massive distension of colonic segments noted on a prior CT performed   on 5/6/2022.      Assessment/Plan   Sandra Gutiérrez is a 53 y.o. female    1. HTN  2. 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 Heart Block - this was detected in January of 2022 on a routine visit. She has no change in her  symptoms. Her amlodipine was discontinued and atenolol started some time ago.      MARCH 2022: Her ECG showed complete heart block with junctional escape rhythm at 50 bpm.   MAY 2022: She was admitted to [facility] [place] for GI issues. While she was admitted, EP was consulted for symptomatic high grade AVB and device was implanted.   MAY 12, 2022: Dual chamber PPM implanted by Darshan Erickson. Model: [employer] 2272 Assurity MRI Serial Number: [id number].         TODAY routine annual follow up. She is feeling well with no discernible cardiac complaints. Assisted living aide  is present with her today. She is following regularly with her PCP. She had extensive hospitalization January for her colonic distention, underwent surgery.   Device checks- no arrhythmias, device is functioning wnl.  ECG in office shows atrial  sensed, V paced. 65 bpm    PLAN  Instructed by Device nurse on remote monitor.  In office device checks annually, remotes q 3mo.  Continue with current medications  Continue with healthy lifestyle modifications  Follow up annually for EP and device check (2 appointments on same day).      Marjorie Moncada, ANGIE-CNP

## 2025-05-16 LAB
ATRIAL RATE: 65 BPM
P AXIS: 75 DEGREES
P OFFSET: 120 MS
P ONSET: 71 MS
PR INTERVAL: 266 MS
Q ONSET: 204 MS
QRS COUNT: 11 BEATS
QRS DURATION: 140 MS
QT INTERVAL: 446 MS
QTC CALCULATION(BAZETT): 463 MS
QTC FREDERICIA: 457 MS
R AXIS: -55 DEGREES
T AXIS: 60 DEGREES
T OFFSET: 427 MS
VENTRICULAR RATE: 65 BPM

## 2025-05-19 ENCOUNTER — HOSPITAL ENCOUNTER (OUTPATIENT)
Dept: CARDIOLOGY | Facility: CLINIC | Age: 55
Discharge: HOME | End: 2025-05-19
Payer: MEDICARE

## 2025-05-19 DIAGNOSIS — I44.2 ATRIOVENTRICULAR BLOCK, COMPLETE (MULTI): ICD-10-CM

## 2025-05-19 DIAGNOSIS — Z95.0 PRESENCE OF CARDIAC PACEMAKER: ICD-10-CM

## 2025-05-19 PROCEDURE — 93296 REM INTERROG EVL PM/IDS: CPT

## 2025-05-19 PROCEDURE — 93294 REM INTERROG EVL PM/LDLS PM: CPT | Performed by: INTERNAL MEDICINE

## 2025-05-28 ENCOUNTER — HOSPITAL ENCOUNTER (OUTPATIENT)
Dept: CARDIOLOGY | Facility: CLINIC | Age: 55
Discharge: HOME | End: 2025-05-28
Payer: MEDICARE

## 2025-05-28 DIAGNOSIS — Z95.0 PRESENCE OF CARDIAC PACEMAKER: ICD-10-CM

## 2025-05-28 DIAGNOSIS — I44.2 ATRIOVENTRICULAR BLOCK, COMPLETE (MULTI): ICD-10-CM

## 2025-05-28 DIAGNOSIS — I44.2 ATRIOVENTRICULAR BLOCK, COMPLETE (MULTI): Primary | ICD-10-CM

## 2025-05-28 PROCEDURE — 93280 PM DEVICE PROGR EVAL DUAL: CPT

## 2025-06-10 ENCOUNTER — HOSPITAL ENCOUNTER (OUTPATIENT)
Dept: CARDIOLOGY | Facility: CLINIC | Age: 55
Discharge: HOME | End: 2025-06-10
Payer: MEDICARE

## 2025-06-10 DIAGNOSIS — I44.2 ATRIOVENTRICULAR BLOCK, COMPLETE (MULTI): ICD-10-CM

## 2025-06-10 DIAGNOSIS — Z95.0 PRESENCE OF CARDIAC PACEMAKER: ICD-10-CM

## 2025-06-20 ENCOUNTER — TELEPHONE (OUTPATIENT)
Dept: PRIMARY CARE | Facility: CLINIC | Age: 55
End: 2025-06-20
Payer: MEDICARE

## 2025-06-20 NOTE — TELEPHONE ENCOUNTER
[facility] called and request more information about your request of lab work.    Phone #: [phone] ext: [phone]

## 2025-06-26 ENCOUNTER — TELEPHONE (OUTPATIENT)
Dept: PRIMARY CARE | Facility: CLINIC | Age: 55
End: 2025-06-26
Payer: MEDICARE

## 2025-06-26 NOTE — TELEPHONE ENCOUNTER
[facility] Assistant living  [phone], is calling to change Gemtesa 75 mg one a day to another medication, because her insurance is not covering it anymore, they have 4 more pills which is enough until Monday, Tuesday she needs the new one, please  send to [facility] (it is in chart)

## 2025-07-02 DIAGNOSIS — N32.81 OAB (OVERACTIVE BLADDER): Primary | ICD-10-CM

## 2025-07-02 RX ORDER — OXYBUTYNIN CHLORIDE 5 MG/1
5 TABLET ORAL DAILY
Qty: 30 TABLET | Refills: 4 | Status: SHIPPED | OUTPATIENT
Start: 2025-07-02 | End: 2025-08-01

## 2025-07-08 RX ORDER — MIRABEGRON 25 MG/1
25 TABLET, FILM COATED, EXTENDED RELEASE ORAL DAILY
Qty: 90 TABLET | Refills: 3 | Status: SHIPPED | OUTPATIENT
Start: 2025-07-08 | End: 2025-10-06

## 2025-08-05 ENCOUNTER — TELEPHONE (OUTPATIENT)
Dept: PRIMARY CARE | Facility: CLINIC | Age: 55
End: 2025-08-05
Payer: MEDICARE

## 2025-08-08 ENCOUNTER — TELEPHONE (OUTPATIENT)
Dept: PRIMARY CARE | Facility: CLINIC | Age: 55
End: 2025-08-08
Payer: MEDICARE

## 2025-08-08 NOTE — TELEPHONE ENCOUNTER
Patient's x-ray has been faxed to our office.  Patient had KUB done, has ileus/ small bowel obstruction.  Advised to be evaluated by the emergency department.  Discussed with the nurse.  Discussed with patient's mother-in-law.

## 2025-08-12 ENCOUNTER — TELEPHONE (OUTPATIENT)
Dept: PRIMARY CARE | Facility: CLINIC | Age: 55
End: 2025-08-12
Payer: MEDICARE

## 2025-08-18 ENCOUNTER — TELEPHONE (OUTPATIENT)
Dept: PRIMARY CARE | Facility: CLINIC | Age: 55
End: 2025-08-18
Payer: MEDICARE

## 2025-09-02 ENCOUNTER — HOSPITAL ENCOUNTER (OUTPATIENT)
Dept: CARDIOLOGY | Facility: CLINIC | Age: 55
Discharge: HOME | End: 2025-09-02
Payer: MEDICARE

## 2025-09-02 DIAGNOSIS — I44.2 ATRIOVENTRICULAR BLOCK, COMPLETE (MULTI): ICD-10-CM

## 2025-09-02 DIAGNOSIS — Z95.0 PRESENCE OF CARDIAC PACEMAKER: ICD-10-CM

## 2025-09-02 PROCEDURE — 93294 REM INTERROG EVL PM/LDLS PM: CPT | Performed by: INTERNAL MEDICINE

## 2025-09-02 PROCEDURE — 93296 REM INTERROG EVL PM/IDS: CPT

## 2025-09-15 ENCOUNTER — APPOINTMENT (OUTPATIENT)
Dept: PRIMARY CARE | Facility: CLINIC | Age: 55
End: 2025-09-15
Payer: MEDICARE